# Patient Record
Sex: FEMALE | Race: BLACK OR AFRICAN AMERICAN | NOT HISPANIC OR LATINO | URBAN - METROPOLITAN AREA
[De-identification: names, ages, dates, MRNs, and addresses within clinical notes are randomized per-mention and may not be internally consistent; named-entity substitution may affect disease eponyms.]

---

## 2019-10-17 ENCOUNTER — EMERGENCY (EMERGENCY)
Facility: HOSPITAL | Age: 29
LOS: 1 days | Discharge: HOME | End: 2019-10-17
Attending: EMERGENCY MEDICINE | Admitting: EMERGENCY MEDICINE
Payer: MEDICAID

## 2019-10-17 VITALS
OXYGEN SATURATION: 100 % | HEART RATE: 89 BPM | TEMPERATURE: 99 F | DIASTOLIC BLOOD PRESSURE: 77 MMHG | SYSTOLIC BLOOD PRESSURE: 109 MMHG | RESPIRATION RATE: 16 BRPM

## 2019-10-17 PROCEDURE — 99283 EMERGENCY DEPT VISIT LOW MDM: CPT

## 2019-10-17 NOTE — ED ADULT TRIAGE NOTE - CHIEF COMPLAINT QUOTE
Pt arrives to ED c/o CP starting Sunday while walking. to center of chest that radiates up to throat and head.  Pt denies medical hx.  EKG completed in triage.  Pt states she had similar issue in the past with an URI but reports she does not have any of those symptoms at this time.

## 2019-10-18 PROCEDURE — 71046 X-RAY EXAM CHEST 2 VIEWS: CPT | Mod: 26

## 2019-10-18 RX ORDER — ACETAMINOPHEN 500 MG
650 TABLET ORAL ONCE
Refills: 0 | Status: COMPLETED | OUTPATIENT
Start: 2019-10-18 | End: 2019-10-18

## 2019-10-18 RX ORDER — IBUPROFEN 200 MG
600 TABLET ORAL ONCE
Refills: 0 | Status: COMPLETED | OUTPATIENT
Start: 2019-10-18 | End: 2019-10-18

## 2019-10-18 RX ADMIN — Medication 600 MILLIGRAM(S): at 01:55

## 2019-10-18 RX ADMIN — Medication 650 MILLIGRAM(S): at 01:55

## 2019-10-18 NOTE — ED PROVIDER NOTE - MUSCULOSKELETAL, MLM
+Mild tenderness to the 2nd intercostal space midclavicular line on the left.  Spine appears normal, range of motion is not limited, no muscle or joint tenderness

## 2019-10-18 NOTE — ED PROVIDER NOTE - CLINICAL SUMMARY MEDICAL DECISION MAKING FREE TEXT BOX
28 y/o female here with chest tightness. Will give Motrin and Tylenol most likely MSK in origin, and perform CXR. Will d/c home with PMD f/u as an outpatient.

## 2019-10-18 NOTE — ED PROVIDER NOTE - OBJECTIVE STATEMENT
30 y/o male with no pertinent PMHx presents to the ED with c/o chest tightness. Pt states has experience this pain intermittently for the last x 5 days. Pt notes has had similar chest tightness x 1 month ago and was seen by PMD and was given Abx for URI sx. Pt denies any fever, chills, cough, nasal congestion, SOB, papulations, N/V, parasthenia, weakness, FHx of cardiac issue, Hx of blood clots, OSP, recent travel, or leg swelling. Of note pain is not pleuritic.

## 2019-10-18 NOTE — ED PROVIDER NOTE - PATIENT PORTAL LINK FT
You can access the FollowMyHealth Patient Portal offered by [facility] by registering at the following website: [email or web address]. By joining [facility]’s FollowMyHealth portal, you will also be able to view your health information using other applications (apps) compatible with our system.

## 2020-09-15 ENCOUNTER — OUTPATIENT (OUTPATIENT)
Dept: INPATIENT UNIT | Facility: HOSPITAL | Age: 30
LOS: 1 days | Discharge: HOME | End: 2020-09-15
Payer: MEDICAID

## 2020-09-15 VITALS
SYSTOLIC BLOOD PRESSURE: 114 MMHG | TEMPERATURE: 98 F | RESPIRATION RATE: 16 BRPM | HEART RATE: 10 BPM | DIASTOLIC BLOOD PRESSURE: 66 MMHG

## 2020-09-15 VITALS — SYSTOLIC BLOOD PRESSURE: 119 MMHG | HEART RATE: 94 BPM | DIASTOLIC BLOOD PRESSURE: 72 MMHG

## 2020-09-15 DIAGNOSIS — Z33.2 ENCOUNTER FOR ELECTIVE TERMINATION OF PREGNANCY: Chronic | ICD-10-CM

## 2020-09-15 DIAGNOSIS — Z3A.00 WEEKS OF GESTATION OF PREGNANCY NOT SPECIFIED: ICD-10-CM

## 2020-09-15 DIAGNOSIS — O26.899 OTHER SPECIFIED PREGNANCY RELATED CONDITIONS, UNSPECIFIED TRIMESTER: ICD-10-CM

## 2020-09-15 LAB
APPEARANCE UR: CLEAR — SIGNIFICANT CHANGE UP
BILIRUB UR-MCNC: NEGATIVE — SIGNIFICANT CHANGE UP
BLOOD UR QL VISUAL: NEGATIVE — SIGNIFICANT CHANGE UP
COLOR SPEC: SIGNIFICANT CHANGE UP
GLUCOSE UR-MCNC: NEGATIVE — SIGNIFICANT CHANGE UP
KETONES UR-MCNC: NEGATIVE — SIGNIFICANT CHANGE UP
LEUKOCYTE ESTERASE UR-ACNC: NEGATIVE — SIGNIFICANT CHANGE UP
NITRITE UR-MCNC: NEGATIVE — SIGNIFICANT CHANGE UP
PH UR: 7 — SIGNIFICANT CHANGE UP (ref 5–8)
PROT UR-MCNC: NEGATIVE — SIGNIFICANT CHANGE UP
SP GR SPEC: 1.02 — SIGNIFICANT CHANGE UP (ref 1–1.04)
UROBILINOGEN FLD QL: NORMAL — SIGNIFICANT CHANGE UP

## 2020-09-15 PROCEDURE — 99212 OFFICE O/P EST SF 10 MIN: CPT | Mod: 25

## 2020-09-15 PROCEDURE — 59025 FETAL NON-STRESS TEST: CPT | Mod: 26

## 2020-09-15 PROCEDURE — 76816 OB US FOLLOW-UP PER FETUS: CPT | Mod: 26

## 2020-09-15 PROCEDURE — 76817 TRANSVAGINAL US OBSTETRIC: CPT | Mod: 26

## 2020-09-15 RX ORDER — FAMOTIDINE 10 MG/ML
20 INJECTION INTRAVENOUS DAILY
Refills: 0 | Status: DISCONTINUED | OUTPATIENT
Start: 2020-09-15 | End: 2020-09-30

## 2020-09-15 RX ORDER — INDOMETHACIN 50 MG
50 CAPSULE ORAL ONCE
Refills: 0 | Status: COMPLETED | OUTPATIENT
Start: 2020-09-15 | End: 2020-09-15

## 2020-09-15 RX ADMIN — FAMOTIDINE 20 MILLIGRAM(S): 10 INJECTION INTRAVENOUS at 17:43

## 2020-09-15 RX ADMIN — Medication 50 MILLIGRAM(S): at 17:43

## 2020-09-15 NOTE — OB PROVIDER TRIAGE NOTE - NSOBPROVIDERNOTE_OBGYN_ALL_OB_FT
1750  plan discussed with dr rogers, dr haile  Indocin 50 mg PO + Pepcid 20 mg PO for myoma pain  PO fluids   continue EFM 1750  plan discussed with dr rogers, dr haile  Indocin 50 mg PO + Pepcid 20 mg PO for myoma pain  PO fluids   continue EFM      1900  patient  feels minimal relief  PO fluid, gingerale and snack given  will continue to monitor and reevaluate paein  report given to night shift NP 175  plan discussed with dr rogers, dr haile  Indocin 50 mg PO + Pepcid 20 mg PO for myoma pain  PO fluids   continue EFM      1900  patient  feels minimal relief  PO fluid, gingerale and snack given  will continue to monitor and reevaluate paein  report given to night shift NP    @1930  NST reactive with moderate variability, cat 1, 10x10 accels  toco no contractions noted  Patient's history and physical exam d/w with Dr Wheeler and Dr Haile   Fetal non stress reviewed with Dr Wheeler   Patient reports relief, pt reports pain 5/10   Patient cleared for discharge  Patient to follow up with scheduled appointment  Patient to return to hospital if pain worsens  Fetal kick counts reviewed   labor precautions reviewed

## 2020-09-15 NOTE — OB PROVIDER TRIAGE NOTE - ADDITIONAL INSTRUCTIONS
Patient to follow up with scheduled appointment  Patient to return to hospital if pain worsens  Tylenol for pain relief   Fetal kick counts reviewed   labor precautions reviewed
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
## 2020-09-15 NOTE — OB PROVIDER TRIAGE NOTE - NSHPPHYSICALEXAM_GEN_ALL_CORE
Vital Signs Last 24 Hrs  T(C): 36.9 (15 Sep 2020 16:07), Max: 36.9 (15 Sep 2020 16:07)  T(F): 98.4 (15 Sep 2020 16:07), Max: 98.4 (15 Sep 2020 16:07)  HR: 105 (15 Sep 2020 16:16) (10 - 105)  BP: 114/66 (15 Sep 2020 16:16) (114/66 - 114/66)  BP(mean): --  RR: 16 (15 Sep 2020 16:07) (16 - 16)  SpO2: --    A&O x3  CTAB  abdomen: gravid, soft, pinpoint tenderness over suprapubic area, no diffuse tenderness  sse: no lof, no VB, cervix appears closed  TVS CL 2.8-3.1 no funneling no dynamic changes  TAS: vtx posterior placenta, bpp 8/8, mvp 5.73

## 2020-09-15 NOTE — OB PROVIDER TRIAGE NOTE - NSHPLABSRESULTS_GEN_ALL_CORE
Urinalysis Basic - ( 15 Sep 2020 16:19 )    Color: LIGHT YELLOW / Appearance: CLEAR / S.017 / pH: 7.0  Gluc: NEGATIVE / Ketone: NEGATIVE  / Bili: NEGATIVE / Urobili: NORMAL   Blood: NEGATIVE / Protein: NEGATIVE / Nitrite: NEGATIVE   Leuk Esterase: NEGATIVE / RBC: x / WBC x   Sq Epi: x / Non Sq Epi: x / Bacteria: x

## 2020-11-15 ENCOUNTER — OUTPATIENT (OUTPATIENT)
Dept: INPATIENT UNIT | Facility: HOSPITAL | Age: 30
LOS: 1 days | Discharge: LEFT BEFORE DISCHARGE | End: 2020-11-15
Payer: COMMERCIAL

## 2020-11-15 ENCOUNTER — EMERGENCY (EMERGENCY)
Facility: HOSPITAL | Age: 30
LOS: 1 days | Discharge: STILL ADMITTED OR ADMINISTRATIVE | End: 2020-11-15
Attending: EMERGENCY MEDICINE | Admitting: EMERGENCY MEDICINE
Payer: COMMERCIAL

## 2020-11-15 VITALS
DIASTOLIC BLOOD PRESSURE: 73 MMHG | WEIGHT: 210.1 LBS | HEART RATE: 90 BPM | SYSTOLIC BLOOD PRESSURE: 125 MMHG | HEIGHT: 66 IN

## 2020-11-15 VITALS
TEMPERATURE: 98 F | HEART RATE: 90 BPM | DIASTOLIC BLOOD PRESSURE: 73 MMHG | RESPIRATION RATE: 18 BRPM | SYSTOLIC BLOOD PRESSURE: 125 MMHG

## 2020-11-15 VITALS
SYSTOLIC BLOOD PRESSURE: 110 MMHG | RESPIRATION RATE: 18 BRPM | OXYGEN SATURATION: 100 % | HEART RATE: 105 BPM | TEMPERATURE: 98 F | DIASTOLIC BLOOD PRESSURE: 88 MMHG

## 2020-11-15 DIAGNOSIS — Z33.2 ENCOUNTER FOR ELECTIVE TERMINATION OF PREGNANCY: Chronic | ICD-10-CM

## 2020-11-15 DIAGNOSIS — O26.899 OTHER SPECIFIED PREGNANCY RELATED CONDITIONS, UNSPECIFIED TRIMESTER: ICD-10-CM

## 2020-11-15 DIAGNOSIS — Z3A.00 WEEKS OF GESTATION OF PREGNANCY NOT SPECIFIED: ICD-10-CM

## 2020-11-15 PROBLEM — D25.9 LEIOMYOMA OF UTERUS, UNSPECIFIED: Chronic | Status: ACTIVE | Noted: 2020-09-15

## 2020-11-15 PROBLEM — K11.7 DISTURBANCES OF SALIVARY SECRETION: Chronic | Status: ACTIVE | Noted: 2020-09-15

## 2020-11-15 LAB
APPEARANCE UR: CLEAR — SIGNIFICANT CHANGE UP
APTT BLD: 27.2 SEC — SIGNIFICANT CHANGE UP (ref 27–36.3)
BASOPHILS # BLD AUTO: 0.03 K/UL — SIGNIFICANT CHANGE UP (ref 0–0.2)
BASOPHILS NFR BLD AUTO: 0.5 % — SIGNIFICANT CHANGE UP (ref 0–2)
BILIRUB UR-MCNC: NEGATIVE — SIGNIFICANT CHANGE UP
BLD GP AB SCN SERPL QL: NEGATIVE — SIGNIFICANT CHANGE UP
BLOOD UR QL VISUAL: NEGATIVE — SIGNIFICANT CHANGE UP
COLOR SPEC: SIGNIFICANT CHANGE UP
EOSINOPHIL # BLD AUTO: 0.04 K/UL — SIGNIFICANT CHANGE UP (ref 0–0.5)
EOSINOPHIL NFR BLD AUTO: 0.6 % — SIGNIFICANT CHANGE UP (ref 0–6)
FIBRINOGEN PPP-MCNC: 566 MG/DL — HIGH (ref 290–520)
GLUCOSE UR-MCNC: NEGATIVE — SIGNIFICANT CHANGE UP
HCT VFR BLD CALC: 33.5 % — LOW (ref 34.5–45)
HGB BLD-MCNC: 10.7 G/DL — LOW (ref 11.5–15.5)
IMM GRANULOCYTES NFR BLD AUTO: 0.3 % — SIGNIFICANT CHANGE UP (ref 0–1.5)
INR BLD: 1 — SIGNIFICANT CHANGE UP (ref 0.88–1.16)
KETONES UR-MCNC: NEGATIVE — SIGNIFICANT CHANGE UP
LEUKOCYTE ESTERASE UR-ACNC: NEGATIVE — SIGNIFICANT CHANGE UP
LYMPHOCYTES # BLD AUTO: 1.58 K/UL — SIGNIFICANT CHANGE UP (ref 1–3.3)
LYMPHOCYTES # BLD AUTO: 24.7 % — SIGNIFICANT CHANGE UP (ref 13–44)
MCHC RBC-ENTMCNC: 29.2 PG — SIGNIFICANT CHANGE UP (ref 27–34)
MCHC RBC-ENTMCNC: 31.9 % — LOW (ref 32–36)
MCV RBC AUTO: 91.3 FL — SIGNIFICANT CHANGE UP (ref 80–100)
MONOCYTES # BLD AUTO: 0.39 K/UL — SIGNIFICANT CHANGE UP (ref 0–0.9)
MONOCYTES NFR BLD AUTO: 6.1 % — SIGNIFICANT CHANGE UP (ref 2–14)
NEUTROPHILS # BLD AUTO: 4.34 K/UL — SIGNIFICANT CHANGE UP (ref 1.8–7.4)
NEUTROPHILS NFR BLD AUTO: 67.8 % — SIGNIFICANT CHANGE UP (ref 43–77)
NITRITE UR-MCNC: NEGATIVE — SIGNIFICANT CHANGE UP
NRBC # FLD: 0 K/UL — SIGNIFICANT CHANGE UP (ref 0–0)
PH UR: 7 — SIGNIFICANT CHANGE UP (ref 5–8)
PLATELET # BLD AUTO: 216 K/UL — SIGNIFICANT CHANGE UP (ref 150–400)
PMV BLD: 10.6 FL — SIGNIFICANT CHANGE UP (ref 7–13)
PROT UR-MCNC: NEGATIVE — SIGNIFICANT CHANGE UP
PROTHROM AB SERPL-ACNC: 11.4 SEC — SIGNIFICANT CHANGE UP (ref 10.6–13.6)
RBC # BLD: 3.67 M/UL — LOW (ref 3.8–5.2)
RBC # FLD: 12.6 % — SIGNIFICANT CHANGE UP (ref 10.3–14.5)
RH IG SCN BLD-IMP: POSITIVE — SIGNIFICANT CHANGE UP
SP GR SPEC: 1.01 — SIGNIFICANT CHANGE UP (ref 1–1.04)
UROBILINOGEN FLD QL: NORMAL — SIGNIFICANT CHANGE UP
WBC # BLD: 6.4 K/UL — SIGNIFICANT CHANGE UP (ref 3.8–10.5)
WBC # FLD AUTO: 6.4 K/UL — SIGNIFICANT CHANGE UP (ref 3.8–10.5)

## 2020-11-15 PROCEDURE — 96360 HYDRATION IV INFUSION INIT: CPT

## 2020-11-15 PROCEDURE — 99282 EMERGENCY DEPT VISIT SF MDM: CPT

## 2020-11-15 PROCEDURE — 76818 FETAL BIOPHYS PROFILE W/NST: CPT | Mod: 26

## 2020-11-15 PROCEDURE — 99214 OFFICE O/P EST MOD 30 MIN: CPT | Mod: 25

## 2020-11-15 RX ORDER — SODIUM CHLORIDE 9 MG/ML
1000 INJECTION, SOLUTION INTRAVENOUS ONCE
Refills: 0 | Status: COMPLETED | OUTPATIENT
Start: 2020-11-15 | End: 2020-11-15

## 2020-11-15 RX ADMIN — SODIUM CHLORIDE 1000 MILLILITER(S): 9 INJECTION, SOLUTION INTRAVENOUS at 17:09

## 2020-11-15 NOTE — OB PROVIDER TRIAGE NOTE - ADDITIONAL INSTRUCTIONS
Follow up with Dr for routine OB care  Continue fetal kick counts/fetal awareness  Return to triage for: decreased fetal movement, leakage of fluid, vaginal bleeding, increase in contraction frequency/intensity, or for any other concerns.
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
## 2020-11-15 NOTE — ED PROVIDER NOTE - PHYSICAL EXAMINATION
Physical Exam:  General: NAD, Conversive  Eyes: EOMI, Conjunctiva and sclera clear  Neck: No JVD  Lungs: Clear to auscultation bilaterally, no wheeze, no rhonchi  Heart: Normal S1, S2, no murmurs  Abdomen: Soft, nontender, nondistended, no CVA tenderness  Extremities: 2+ peripheral pulses, no edema  Psych: AAO X3  Neck: No pain with lateral motion, no midline tenderness  Neurologic: Non-focal,
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
## 2020-11-15 NOTE — OB PROVIDER TRIAGE NOTE - NS_OBGYNHISTORY_OBGYN_ALL_OB_FT
2008-Surgical ETOP  Uterine fibroid (unknown size/location)  Pt denies any h/o: Abn. PAP, ovarian cysts, breast problems, STDs/STIs

## 2020-11-15 NOTE — OB PROVIDER TRIAGE NOTE - NSHPLABSRESULTS_GEN_ALL_CORE
Vital Signs Last 24 Hrs  T(C): 36.9 (15 Nov 2020 15:48), Max: 36.9 (15 Nov 2020 15:46)  T(F): 98.42 (15 Nov 2020 15:48), Max: 98.42 (15 Nov 2020 15:48)  HR: 90 (15 Nov 2020 15:51) (90 - 105)  BP: 125/73 (15 Nov 2020 15:51) (110/88 - 125/73)  BP(mean): --  RR: 18 (15 Nov 2020 15:46) (18 - 18)  SpO2: 100% (15 Nov 2020 14:17) (100% - 100%) Type + Screen (11.15.20 @ 16:08)   ABO Interpretation: A   Rh Interpretation: Positive   Antibody Screen: Negative                           10.7   6.40  )-----------( 216      ( 15 Nov 2020 16:16 )             33.5     PT/INR - ( 15 Nov 2020 16:16 )   PT: 11.4 SEC;   INR: 1.00          PTT - ( 15 Nov 2020 16:16 )  PTT:27.2 SEC    Fibrinogen Assay (11.15.20 @ 16:16)   Fibrinogen Assay: 566.0 mg/dL     Urinalysis Basic - ( 15 Nov 2020 16:16 )    Color: LIGHT YELLOW / Appearance: CLEAR / S.015 / pH: 7.0  Gluc: NEGATIVE / Ketone: NEGATIVE  / Bili: NEGATIVE / Urobili: NORMAL   Blood: NEGATIVE / Protein: NEGATIVE / Nitrite: NEGATIVE   Leuk Esterase: NEGATIVE / RBC: x / WBC x   Sq Epi: x / Non Sq Epi: x / Bacteria: x

## 2020-11-15 NOTE — ED PROVIDER NOTE - NS ED ROS FT
GENERAL: No fever or chills  EYES: No change in vision  HEENT: No trouble swallowing or speaking  CARDIAC: No chest pain  PULMONARY: No cough or SOB  GI: No abdominal pain, no nausea or no vomiting, no diarrhea or constipation  : No changes in urination  SKIN: No rashes  NEURO: + mild headache, no numbness  MSK: No joint pain  Otherwise as HPI or negative.
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
## 2020-11-15 NOTE — OB PROVIDER TRIAGE NOTE - NSOBPROVIDERNOTE_OBGYN_ALL_OB_FT
Patient made aware of regular asymptomatic contractions and need to stay overnight for observation. Patient declines admission at this time stating baby shower is going on right now, agreeable to a maximum of one hour of prolonged monitoring. Pt counseled on likelihood of placental abruption after abdominal trauma, which could lead to complications, including fetal demise. Pt verbalized understanding and states she will come back tomorrow for prolonged monitoring if anything is concerning.  1705-Dr Mcneal, PGY-3 notified of above findings. Will be in for bedside consult. Patient made aware of regular asymptomatic contractions and need to stay overnight for observation. Patient declines admission at this time stating baby shower is going on right now, agreeable to a maximum of one hour of prolonged monitoring. Pt counseled on likelihood of placental abruption after abdominal trauma, which could lead to complications, including fetal demise. Pt verbalized understanding and states she will come back tomorrow for prolonged monitoring if anything is concerning.  1705-Dr Mcneal, PGY-3 notified of above findings. Will be in for bedside consult.  1720-Patient counseled by Dr Mcneal. Pt still wishes to sign out AMA after 1 hour of monitoring. Patient made aware of regular asymptomatic contractions and need to stay overnight for observation. Patient declines admission at this time stating baby shower is going on right now, agreeable to a maximum of one hour of prolonged monitoring. Pt counseled on likelihood of placental abruption after abdominal trauma, which could lead to complications, including fetal demise. Pt verbalized understanding and states she will come back tomorrow for prolonged monitoring if anything is concerning.  1705-Dr Mcneal, PGY-3 notified of above findings. Will be in for bedside consult.  1720-Patient counseled by Dr Mcneal. Pt still wishes to sign out AMA @ 6pm. Patient made aware of regular asymptomatic contractions and need to stay overnight for observation. Patient declines admission at this time stating baby shower is going on right now. Pt amenable to a maximum of one hour of prolonged monitoring. Pt counseled on likelihood of placental abruption after abdominal trauma, which could lead to complications, including fetal demise. Pt verbalized understanding and states she will come back tomorrow for prolonged monitoring if anything is concerning.  1705-Dr Mcneal, PGY-3 notified of above findings. Will be in for bedside consult.  1720-Patient counseled by Dr Mcneal. Pt still wishes to sign out AMA @ 6pm. Patient made aware of regular asymptomatic contractions and need to stay overnight for observation. Patient declines admission at this time stating baby shower is going on right now. Pt amenable to a maximum of one hour of prolonged monitoring. Pt counseled on likelihood of placental abruption after abdominal trauma, which could lead to complications, including fetal demise. Pt verbalized understanding and states she will come back tomorrow for prolonged monitoring if anything is concerning.  1705-Dr Mcneal, PGY-3 notified of above findings. Will be in for bedside consult.  1720-Patient counseled by Dr Mcneal. Pt still wishes to sign out AMA @ 6pm.  Patient s/p 1:15 hr of monitoring: Cat 1 tracing. [place] q 3-4 min, pt denies pain.   Patient signed out AMA as mentioned above. Pt agrees to return to triage for: Decreased fetal movement, vaginal bleeding, leakage of fluid, painful contractions.
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
## 2020-11-15 NOTE — OB RN TRIAGE NOTE - PRO MENTAL HEALTH SX RECENT
none

## 2020-11-16 ENCOUNTER — OUTPATIENT (OUTPATIENT)
Dept: INPATIENT UNIT | Facility: HOSPITAL | Age: 30
LOS: 1 days | Discharge: HOME | End: 2020-11-16
Payer: MEDICAID

## 2020-11-16 VITALS
TEMPERATURE: 99 F | RESPIRATION RATE: 16 BRPM | HEART RATE: 100 BPM | DIASTOLIC BLOOD PRESSURE: 74 MMHG | SYSTOLIC BLOOD PRESSURE: 126 MMHG

## 2020-11-16 DIAGNOSIS — Z3A.00 WEEKS OF GESTATION OF PREGNANCY NOT SPECIFIED: ICD-10-CM

## 2020-11-16 DIAGNOSIS — O26.899 OTHER SPECIFIED PREGNANCY RELATED CONDITIONS, UNSPECIFIED TRIMESTER: ICD-10-CM

## 2020-11-16 DIAGNOSIS — Z33.2 ENCOUNTER FOR ELECTIVE TERMINATION OF PREGNANCY: Chronic | ICD-10-CM

## 2020-11-16 LAB
APPEARANCE UR: CLEAR — SIGNIFICANT CHANGE UP
APTT BLD: 27.5 SEC — SIGNIFICANT CHANGE UP (ref 27–36.3)
BILIRUB UR-MCNC: NEGATIVE — SIGNIFICANT CHANGE UP
BLOOD UR QL VISUAL: NEGATIVE — SIGNIFICANT CHANGE UP
COLOR SPEC: YELLOW — SIGNIFICANT CHANGE UP
FIBRINOGEN PPP-MCNC: 557 MG/DL — HIGH (ref 290–520)
GLUCOSE UR-MCNC: NEGATIVE — SIGNIFICANT CHANGE UP
HCT VFR BLD CALC: 31.8 % — LOW (ref 34.5–45)
HGB BLD-MCNC: 10.1 G/DL — LOW (ref 11.5–15.5)
INR BLD: 0.96 — SIGNIFICANT CHANGE UP (ref 0.88–1.16)
KETONES UR-MCNC: NEGATIVE — SIGNIFICANT CHANGE UP
LEUKOCYTE ESTERASE UR-ACNC: NEGATIVE — SIGNIFICANT CHANGE UP
MCHC RBC-ENTMCNC: 28.9 PG — SIGNIFICANT CHANGE UP (ref 27–34)
MCHC RBC-ENTMCNC: 31.8 % — LOW (ref 32–36)
MCV RBC AUTO: 91.1 FL — SIGNIFICANT CHANGE UP (ref 80–100)
NITRITE UR-MCNC: NEGATIVE — SIGNIFICANT CHANGE UP
NRBC # FLD: 0 K/UL — SIGNIFICANT CHANGE UP (ref 0–0)
PH UR: 6.5 — SIGNIFICANT CHANGE UP (ref 5–8)
PLATELET # BLD AUTO: 210 K/UL — SIGNIFICANT CHANGE UP (ref 150–400)
PMV BLD: 10.6 FL — SIGNIFICANT CHANGE UP (ref 7–13)
PROT UR-MCNC: 10 — SIGNIFICANT CHANGE UP
PROTHROM AB SERPL-ACNC: 11.1 SEC — SIGNIFICANT CHANGE UP (ref 10.6–13.6)
RBC # BLD: 3.49 M/UL — LOW (ref 3.8–5.2)
RBC # FLD: 12.7 % — SIGNIFICANT CHANGE UP (ref 10.3–14.5)
SP GR SPEC: 1.02 — SIGNIFICANT CHANGE UP (ref 1–1.04)
UROBILINOGEN FLD QL: NORMAL — SIGNIFICANT CHANGE UP
WBC # BLD: 6.16 K/UL — SIGNIFICANT CHANGE UP (ref 3.8–10.5)
WBC # FLD AUTO: 6.16 K/UL — SIGNIFICANT CHANGE UP (ref 3.8–10.5)

## 2020-11-16 PROCEDURE — 99214 OFFICE O/P EST MOD 30 MIN: CPT | Mod: 25

## 2020-11-16 PROCEDURE — 76816 OB US FOLLOW-UP PER FETUS: CPT | Mod: 26

## 2020-11-16 PROCEDURE — 76818 FETAL BIOPHYS PROFILE W/NST: CPT | Mod: 26

## 2020-11-16 RX ORDER — SODIUM CHLORIDE 9 MG/ML
1000 INJECTION, SOLUTION INTRAVENOUS ONCE
Refills: 0 | Status: COMPLETED | OUTPATIENT
Start: 2020-11-16 | End: 2020-11-16

## 2020-11-16 RX ADMIN — SODIUM CHLORIDE 1000 MILLILITER(S): 9 INJECTION, SOLUTION INTRAVENOUS at 22:05

## 2020-11-16 RX ADMIN — SODIUM CHLORIDE 1000 MILLILITER(S): 9 INJECTION, SOLUTION INTRAVENOUS at 20:40

## 2020-11-16 NOTE — OB PROVIDER TRIAGE NOTE - NSHPLABSRESULTS_GEN_ALL_CORE
Urinalysis Basic - ( 2020 19:55 )    Color: YELLOW / Appearance: CLEAR / S.018 / pH: 6.5  Gluc: NEGATIVE / Ketone: NEGATIVE  / Bili: NEGATIVE / Urobili: NORMAL   Blood: NEGATIVE / Protein: 10 / Nitrite: NEGATIVE   Leuk Esterase: NEGATIVE / RBC: x / WBC x   Sq Epi: x / Non Sq Epi: x / Bacteria: x Urinalysis Basic - ( 2020 19:55 )    Color: YELLOW / Appearance: CLEAR / S.018 / pH: 6.5  Gluc: NEGATIVE / Ketone: NEGATIVE  / Bili: NEGATIVE / Urobili: NORMAL   Blood: NEGATIVE / Protein: 10 / Nitrite: NEGATIVE   Leuk Esterase: NEGATIVE / RBC: x / WBC x   Sq Epi: x / Non Sq Epi: x / Bacteria: x    CBC Full  -  ( 2020 22:00 )  WBC Count : 6.16 K/uL  RBC Count : 3.49 M/uL  Hemoglobin : 10.1 g/dL  Hematocrit : 31.8 %  Platelet Count - Automated : 210 K/uL  Mean Cell Volume : 91.1 fL  Mean Cell Hemoglobin : 28.9 pg  Mean Cell Hemoglobin Concentration : 31.8 %  Auto Neutrophil # : x  Auto Lymphocyte # : x  Auto Monocyte # : x  Auto Eosinophil # : x  Auto Basophil # : x  Auto Neutrophil % : x  Auto Lymphocyte % : x  Auto Monocyte % : x  Auto Eosinophil % : x  Auto Basophil % : x    PT/INR - ( 2020 22:00 )   PT: 11.1 SEC;   INR: 0.96          PTT - ( 2020 22:00 )  PTT:27.5 SEC    Fibrinogen: 557    Reviewed with Dr Coleman
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
## 2020-11-16 NOTE — OB PROVIDER TRIAGE NOTE - NSHPPHYSICALEXAM_GEN_ALL_CORE
Vital Signs Last 24 Hrs  T(C): 37.2 (16 Nov 2020 19:39), Max: 37.2 (16 Nov 2020 19:39)  T(F): 99 (16 Nov 2020 19:39), Max: 99 (16 Nov 2020 19:39)  HR: 100 (16 Nov 2020 19:54) (100 - 100)  BP: 126/74 (16 Nov 2020 19:54) (126/74 - 126/74)  BP(mean): --  RR: 16 (16 Nov 2020 19:39) (16 - 16)  SpO2: --    General: A&O x3   Abdomen: soft, non tender. no guarding or rebound tenderness  SSE:  no bleeding noted in vault  scant leukorrhea noted  negative pooling  negative nitrazine   negative ferning   SVE:   0.5/50/-3   TAS:     NST in progress Vital Signs Last 24 Hrs  T(C): 37.2 (16 Nov 2020 19:39), Max: 37.2 (16 Nov 2020 19:39)  T(F): 99 (16 Nov 2020 19:39), Max: 99 (16 Nov 2020 19:39)  HR: 100 (16 Nov 2020 19:54) (100 - 100)  BP: 126/74 (16 Nov 2020 19:54) (126/74 - 126/74)  BP(mean): --  RR: 16 (16 Nov 2020 19:39) (16 - 16)  SpO2: --    General: A&O x3   Abdomen: soft, non tender. no guarding or rebound tenderness  SSE:  no bleeding noted in vault  scant leukorrhea noted  negative pooling  negative nitrazine   negative ferning   SVE:   0.5/50/-3   TAS: BPP 8/8, SUBHA 11.03, vertex presentation, posterior placenta     NST in progress

## 2020-11-16 NOTE — OB PROVIDER TRIAGE NOTE - NSOBPROVIDERNOTE_OBGYN_ALL_OB_FT
Vital Signs Last 24 Hrs  T(C): 37.2 (16 Nov 2020 19:39), Max: 37.2 (16 Nov 2020 19:39)  T(F): 99 (16 Nov 2020 19:39), Max: 99 (16 Nov 2020 19:39)  HR: 100 (16 Nov 2020 19:54) (100 - 100)  BP: 126/74 (16 Nov 2020 19:54) (126/74 - 126/74)  BP(mean): --  RR: 16 (16 Nov 2020 19:39) (16 - 16)  SpO2: --    General: A&O x3   Abdomen: soft, non tender. no guarding or rebound tenderness  SSE:  no bleeding noted in vault  scant leukorrhea noted  negative pooling  negative nitrazine   negative ferning   SVE:   0.5/50/-3   TAS:     NST in progress    Plan:   -Will continue to monitor patient   -1L bolus Vital Signs Last 24 Hrs  T(C): 37.2 (16 Nov 2020 19:39), Max: 37.2 (16 Nov 2020 19:39)  T(F): 99 (16 Nov 2020 19:39), Max: 99 (16 Nov 2020 19:39)  HR: 100 (16 Nov 2020 19:54) (100 - 100)  BP: 126/74 (16 Nov 2020 19:54) (126/74 - 126/74)  BP(mean): --  RR: 16 (16 Nov 2020 19:39) (16 - 16)  SpO2: --    General: A&O x3   Abdomen: soft, non tender. no guarding or rebound tenderness  SSE:  no bleeding noted in vault  scant leukorrhea noted  negative pooling  negative nitrazine   negative ferning   SVE:   0.5/50/-3   TAS: BPP 8/8, SUBHA 11.03cm, posterior placenta, vertex presentation     NST in progress    Plan:   -Will continue to monitor patient   -1L bolus Vital Signs Last 24 Hrs  T(C): 37.2 (16 Nov 2020 19:39), Max: 37.2 (16 Nov 2020 19:39)  T(F): 99 (16 Nov 2020 19:39), Max: 99 (16 Nov 2020 19:39)  HR: 100 (16 Nov 2020 19:54) (100 - 100)  BP: 126/74 (16 Nov 2020 19:54) (126/74 - 126/74)  BP(mean): --  RR: 16 (16 Nov 2020 19:39) (16 - 16)  SpO2: --    General: A&O x3   Abdomen: soft, non tender. no guarding or rebound tenderness  SSE:  no bleeding noted in vault  scant leukorrhea noted  negative pooling  negative nitrazine   negative ferning   SVE:   0.5/50/-3   TAS: BPP 8/8, SUBHA 11.03cm, posterior placenta, vertex presentation     NST in progress    Plan:   -Will continue to monitor patient   -1L bolus    @ Vital Signs Last 24 Hrs  T(C): 37.2 (16 Nov 2020 19:39), Max: 37.2 (16 Nov 2020 19:39)  T(F): 99 (16 Nov 2020 19:39), Max: 99 (16 Nov 2020 19:39)  HR: 100 (16 Nov 2020 19:54) (100 - 100)  BP: 126/74 (16 Nov 2020 19:54) (126/74 - 126/74)  BP(mean): --  RR: 16 (16 Nov 2020 19:39) (16 - 16)  SpO2: --    General: A&O x3   Abdomen: soft, non tender. no guarding or rebound tenderness  SSE:  no bleeding noted in vault  scant leukorrhea noted  negative pooling  negative nitrazine   negative ferning   SVE:   0.5/50/-3   TAS: BPP 8/8, SUBHA 11.03cm, posterior placenta, vertex presentation     NST in progress    Plan:   -Will continue to monitor patient   -1L bolus    @2040  D/w with Dr Zepeda   NST non reactive with moderate variability, cat2  toco irregular ctx 1-2 minutes     Plan:  -Pt resuscitated with position   -1L bolus   -STAT CBC, PT/PTT/Fibrinogen Vital Signs Last 24 Hrs  T(C): 37.2 (2020 19:39), Max: 37.2 (2020 19:39)  T(F): 99 (2020 19:39), Max: 99 (2020 19:39)  HR: 100 (2020 19:54) (100 - 100)  BP: 126/74 (2020 19:54) (126/74 - 126/74)  BP(mean): --  RR: 16 (2020 19:39) (16 - 16)  SpO2: --    General: A&O x3   Abdomen: soft, non tender. no guarding or rebound tenderness  SSE:  no bleeding noted in vault  scant leukorrhea noted  negative pooling  negative nitrazine   negative ferning   SVE:   0.5/50/-3   TAS: BPP 8/8, SUBHA 11.03cm, posterior placenta, vertex presentation     NST in progress    Plan:   -Will continue to monitor patient   -1L bolus    @0  D/w with Dr Zepeda   NST non reactive with moderate variability, cat1  toco irregular ctx 1-2 minutes   pt denies feeling any contractions     Plan:  -Pt resuscitated with position   -1L bolus   -STAT CBC, PT/PTT/Fibrinogen    @2347  Fetal non-stress test reviewed and cleared by Dr Coleman, Dr Barnett and Niurka  NP expressed concerns regarding Non Reactive NST in four hours with ctx 1-2 minutes   Patient denies any pain at this time   Vital signs stable   Plan:  -Will repeat SVE   -As per MD, if unchanged, patient will be cleared for discharge    @0030  Repeat SVE: 05./50/-3  d/w with Dr Coleman and Dr Barnett  Maternal and fetal status reassuring   Plan:  -Patient cleared for discharge   -Patient will follow up with scheduled appointment on 2020  -Fetal kick counts reviewed with patient  - labor precautions reviewed with patient   -Signs and symptoms of abruption reviewed with patient   -Patient to increase hydration
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
## 2020-11-16 NOTE — OB PROVIDER TRIAGE NOTE - ADDITIONAL INSTRUCTIONS
-Patient will follow up with scheduled appointment on 2020  -Fetal kick counts reviewed with patient  - labor precautions reviewed with patient   -Signs and symptoms of abruption reviewed with patient   -Patient to increase hydration

## 2020-11-17 VITALS — DIASTOLIC BLOOD PRESSURE: 74 MMHG | HEART RATE: 86 BPM | SYSTOLIC BLOOD PRESSURE: 123 MMHG

## 2020-11-18 LAB — RBC # BLD FETUS AUTO: 0.1 — SIGNIFICANT CHANGE UP

## 2020-12-09 ENCOUNTER — INPATIENT (INPATIENT)
Facility: HOSPITAL | Age: 30
LOS: 1 days | Discharge: HOME | End: 2020-12-11
Attending: SPECIALIST | Admitting: SPECIALIST
Payer: MEDICAID

## 2020-12-09 VITALS
DIASTOLIC BLOOD PRESSURE: 72 MMHG | SYSTOLIC BLOOD PRESSURE: 130 MMHG | HEART RATE: 94 BPM | TEMPERATURE: 99 F | RESPIRATION RATE: 16 BRPM

## 2020-12-09 DIAGNOSIS — Z3A.00 WEEKS OF GESTATION OF PREGNANCY NOT SPECIFIED: ICD-10-CM

## 2020-12-09 DIAGNOSIS — O26.899 OTHER SPECIFIED PREGNANCY RELATED CONDITIONS, UNSPECIFIED TRIMESTER: ICD-10-CM

## 2020-12-09 DIAGNOSIS — Z33.2 ENCOUNTER FOR ELECTIVE TERMINATION OF PREGNANCY: Chronic | ICD-10-CM

## 2020-12-09 DIAGNOSIS — O42.90 PREMATURE RUPTURE OF MEMBRANES, UNSPECIFIED AS TO LENGTH OF TIME BETWEEN RUPTURE AND ONSET OF LABOR, UNSPECIFIED WEEKS OF GESTATION: ICD-10-CM

## 2020-12-09 LAB
ALBUMIN SERPL ELPH-MCNC: 3.8 G/DL — SIGNIFICANT CHANGE UP (ref 3.3–5)
ALP SERPL-CCNC: 101 U/L — SIGNIFICANT CHANGE UP (ref 40–120)
ALT FLD-CCNC: 9 U/L — SIGNIFICANT CHANGE UP (ref 4–33)
ANION GAP SERPL CALC-SCNC: 13 MMOL/L — SIGNIFICANT CHANGE UP (ref 7–14)
APPEARANCE UR: ABNORMAL
APTT BLD: 26.6 SEC — LOW (ref 27–36.3)
AST SERPL-CCNC: 17 U/L — SIGNIFICANT CHANGE UP (ref 4–32)
BACTERIA # UR AUTO: ABNORMAL
BASOPHILS # BLD AUTO: 0.02 K/UL — SIGNIFICANT CHANGE UP (ref 0–0.2)
BASOPHILS NFR BLD AUTO: 0.2 % — SIGNIFICANT CHANGE UP (ref 0–2)
BILIRUB SERPL-MCNC: 0.3 MG/DL — SIGNIFICANT CHANGE UP (ref 0.2–1.2)
BILIRUB UR-MCNC: NEGATIVE — SIGNIFICANT CHANGE UP
BLD GP AB SCN SERPL QL: NEGATIVE — SIGNIFICANT CHANGE UP
BUN SERPL-MCNC: 8 MG/DL — SIGNIFICANT CHANGE UP (ref 7–23)
CALCIUM SERPL-MCNC: 9.6 MG/DL — SIGNIFICANT CHANGE UP (ref 8.4–10.5)
CHLORIDE SERPL-SCNC: 104 MMOL/L — SIGNIFICANT CHANGE UP (ref 98–107)
CO2 SERPL-SCNC: 23 MMOL/L — SIGNIFICANT CHANGE UP (ref 22–31)
COLOR SPEC: ABNORMAL
CREAT ?TM UR-MCNC: <4 MG/DL — SIGNIFICANT CHANGE UP
CREAT SERPL-MCNC: 0.58 MG/DL — SIGNIFICANT CHANGE UP (ref 0.5–1.3)
DIFF PNL FLD: ABNORMAL
EOSINOPHIL # BLD AUTO: 0.04 K/UL — SIGNIFICANT CHANGE UP (ref 0–0.5)
EOSINOPHIL NFR BLD AUTO: 0.5 % — SIGNIFICANT CHANGE UP (ref 0–6)
EPI CELLS # UR: 10 /HPF — HIGH (ref 0–5)
FIBRINOGEN PPP-MCNC: 626 MG/DL — HIGH (ref 300–520)
GLUCOSE SERPL-MCNC: 87 MG/DL — SIGNIFICANT CHANGE UP (ref 70–99)
GLUCOSE UR QL: NEGATIVE — SIGNIFICANT CHANGE UP
HCT VFR BLD CALC: 36.4 % — SIGNIFICANT CHANGE UP (ref 34.5–45)
HGB BLD-MCNC: 11.5 G/DL — SIGNIFICANT CHANGE UP (ref 11.5–15.5)
IANC: 6.74 K/UL — SIGNIFICANT CHANGE UP (ref 1.5–8.5)
IMM GRANULOCYTES NFR BLD AUTO: 0.3 % — SIGNIFICANT CHANGE UP (ref 0–1.5)
INR BLD: 0.97 RATIO — SIGNIFICANT CHANGE UP (ref 0.88–1.17)
KETONES UR-MCNC: NEGATIVE — SIGNIFICANT CHANGE UP
LDH SERPL L TO P-CCNC: 191 U/L — SIGNIFICANT CHANGE UP (ref 135–225)
LEUKOCYTE ESTERASE UR-ACNC: NEGATIVE — SIGNIFICANT CHANGE UP
LYMPHOCYTES # BLD AUTO: 1.43 K/UL — SIGNIFICANT CHANGE UP (ref 1–3.3)
LYMPHOCYTES # BLD AUTO: 16.4 % — SIGNIFICANT CHANGE UP (ref 13–44)
MCHC RBC-ENTMCNC: 28.8 PG — SIGNIFICANT CHANGE UP (ref 27–34)
MCHC RBC-ENTMCNC: 31.6 GM/DL — LOW (ref 32–36)
MCV RBC AUTO: 91.2 FL — SIGNIFICANT CHANGE UP (ref 80–100)
MONOCYTES # BLD AUTO: 0.48 K/UL — SIGNIFICANT CHANGE UP (ref 0–0.9)
MONOCYTES NFR BLD AUTO: 5.5 % — SIGNIFICANT CHANGE UP (ref 2–14)
NEUTROPHILS # BLD AUTO: 6.74 K/UL — SIGNIFICANT CHANGE UP (ref 1.8–7.4)
NEUTROPHILS NFR BLD AUTO: 77.1 % — HIGH (ref 43–77)
NITRITE UR-MCNC: NEGATIVE — SIGNIFICANT CHANGE UP
NRBC # BLD: 0 /100 WBCS — SIGNIFICANT CHANGE UP
NRBC # FLD: 0.02 K/UL — HIGH
PH UR: 8 — SIGNIFICANT CHANGE UP (ref 5–8)
PLATELET # BLD AUTO: 196 K/UL — SIGNIFICANT CHANGE UP (ref 150–400)
POTASSIUM SERPL-MCNC: 4.2 MMOL/L — SIGNIFICANT CHANGE UP (ref 3.5–5.3)
POTASSIUM SERPL-SCNC: 4.2 MMOL/L — SIGNIFICANT CHANGE UP (ref 3.5–5.3)
PROT ?TM UR-MCNC: 146 MG/DL — SIGNIFICANT CHANGE UP
PROT SERPL-MCNC: 6.9 G/DL — SIGNIFICANT CHANGE UP (ref 6–8.3)
PROT UR-MCNC: ABNORMAL
PROTHROM AB SERPL-ACNC: 11.1 SEC — SIGNIFICANT CHANGE UP (ref 9.8–13.1)
RBC # BLD: 3.99 M/UL — SIGNIFICANT CHANGE UP (ref 3.8–5.2)
RBC # FLD: 12.4 % — SIGNIFICANT CHANGE UP (ref 10.3–14.5)
RBC CASTS # UR COMP ASSIST: >10 /HPF — HIGH (ref 0–4)
RH IG SCN BLD-IMP: POSITIVE — SIGNIFICANT CHANGE UP
SARS-COV-2 IGG SERPL QL IA: POSITIVE
SARS-COV-2 IGM SERPL IA-ACNC: 15.5 INDEX — HIGH
SARS-COV-2 RNA SPEC QL NAA+PROBE: SIGNIFICANT CHANGE UP
SODIUM SERPL-SCNC: 140 MMOL/L — SIGNIFICANT CHANGE UP (ref 135–145)
SP GR SPEC: 1.01 — LOW (ref 1.01–1.02)
URATE SERPL-MCNC: 6.4 MG/DL — SIGNIFICANT CHANGE UP (ref 2.5–7)
UROBILINOGEN FLD QL: SIGNIFICANT CHANGE UP
WBC # BLD: 8.74 K/UL — SIGNIFICANT CHANGE UP (ref 3.8–10.5)
WBC # FLD AUTO: 8.74 K/UL — SIGNIFICANT CHANGE UP (ref 3.8–10.5)
WBC UR QL: 5 /HPF — SIGNIFICANT CHANGE UP (ref 0–5)

## 2020-12-09 PROCEDURE — 59409 OBSTETRICAL CARE: CPT | Mod: U9,UB,GC

## 2020-12-09 RX ORDER — IBUPROFEN 200 MG
600 TABLET ORAL EVERY 6 HOURS
Refills: 0 | Status: COMPLETED | OUTPATIENT
Start: 2020-12-09 | End: 2021-11-07

## 2020-12-09 RX ORDER — DIPHENHYDRAMINE HCL 50 MG
25 CAPSULE ORAL EVERY 6 HOURS
Refills: 0 | Status: DISCONTINUED | OUTPATIENT
Start: 2020-12-09 | End: 2020-12-11

## 2020-12-09 RX ORDER — MAGNESIUM HYDROXIDE 400 MG/1
30 TABLET, CHEWABLE ORAL
Refills: 0 | Status: DISCONTINUED | OUTPATIENT
Start: 2020-12-09 | End: 2020-12-11

## 2020-12-09 RX ORDER — OXYCODONE HYDROCHLORIDE 5 MG/1
5 TABLET ORAL ONCE
Refills: 0 | Status: DISCONTINUED | OUTPATIENT
Start: 2020-12-09 | End: 2020-12-11

## 2020-12-09 RX ORDER — SIMETHICONE 80 MG/1
80 TABLET, CHEWABLE ORAL EVERY 4 HOURS
Refills: 0 | Status: DISCONTINUED | OUTPATIENT
Start: 2020-12-09 | End: 2020-12-11

## 2020-12-09 RX ORDER — TETANUS TOXOID, REDUCED DIPHTHERIA TOXOID AND ACELLULAR PERTUSSIS VACCINE, ADSORBED 5; 2.5; 8; 8; 2.5 [IU]/.5ML; [IU]/.5ML; UG/.5ML; UG/.5ML; UG/.5ML
0.5 SUSPENSION INTRAMUSCULAR ONCE
Refills: 0 | Status: DISCONTINUED | OUTPATIENT
Start: 2020-12-09 | End: 2020-12-11

## 2020-12-09 RX ORDER — OXYCODONE HYDROCHLORIDE 5 MG/1
5 TABLET ORAL
Refills: 0 | Status: DISCONTINUED | OUTPATIENT
Start: 2020-12-09 | End: 2020-12-11

## 2020-12-09 RX ORDER — CITRIC ACID/SODIUM CITRATE 300-500 MG
15 SOLUTION, ORAL ORAL EVERY 6 HOURS
Refills: 0 | Status: DISCONTINUED | OUTPATIENT
Start: 2020-12-09 | End: 2020-12-09

## 2020-12-09 RX ORDER — HYDROCORTISONE 1 %
1 OINTMENT (GRAM) TOPICAL EVERY 6 HOURS
Refills: 0 | Status: DISCONTINUED | OUTPATIENT
Start: 2020-12-09 | End: 2020-12-11

## 2020-12-09 RX ORDER — AER TRAVELER 0.5 G/1
1 SOLUTION RECTAL; TOPICAL EVERY 4 HOURS
Refills: 0 | Status: DISCONTINUED | OUTPATIENT
Start: 2020-12-09 | End: 2020-12-11

## 2020-12-09 RX ORDER — BUTORPHANOL TARTRATE 2 MG/ML
2 INJECTION, SOLUTION INTRAMUSCULAR; INTRAVENOUS ONCE
Refills: 0 | Status: DISCONTINUED | OUTPATIENT
Start: 2020-12-09 | End: 2020-12-09

## 2020-12-09 RX ORDER — LANOLIN
1 OINTMENT (GRAM) TOPICAL EVERY 6 HOURS
Refills: 0 | Status: DISCONTINUED | OUTPATIENT
Start: 2020-12-09 | End: 2020-12-11

## 2020-12-09 RX ORDER — PRAMOXINE HYDROCHLORIDE 150 MG/15G
1 AEROSOL, FOAM RECTAL EVERY 4 HOURS
Refills: 0 | Status: DISCONTINUED | OUTPATIENT
Start: 2020-12-09 | End: 2020-12-11

## 2020-12-09 RX ORDER — BENZOCAINE 10 %
1 GEL (GRAM) MUCOUS MEMBRANE EVERY 6 HOURS
Refills: 0 | Status: DISCONTINUED | OUTPATIENT
Start: 2020-12-09 | End: 2020-12-11

## 2020-12-09 RX ORDER — OXYTOCIN 10 UNIT/ML
333.33 VIAL (ML) INJECTION
Qty: 20 | Refills: 0 | Status: DISCONTINUED | OUTPATIENT
Start: 2020-12-09 | End: 2020-12-09

## 2020-12-09 RX ORDER — SODIUM CHLORIDE 9 MG/ML
1000 INJECTION, SOLUTION INTRAVENOUS ONCE
Refills: 0 | Status: COMPLETED | OUTPATIENT
Start: 2020-12-09 | End: 2020-12-09

## 2020-12-09 RX ORDER — ACETAMINOPHEN 500 MG
975 TABLET ORAL
Refills: 0 | Status: DISCONTINUED | OUTPATIENT
Start: 2020-12-09 | End: 2020-12-11

## 2020-12-09 RX ORDER — SODIUM CHLORIDE 9 MG/ML
3 INJECTION INTRAMUSCULAR; INTRAVENOUS; SUBCUTANEOUS EVERY 8 HOURS
Refills: 0 | Status: DISCONTINUED | OUTPATIENT
Start: 2020-12-09 | End: 2020-12-11

## 2020-12-09 RX ORDER — KETOROLAC TROMETHAMINE 30 MG/ML
30 SYRINGE (ML) INJECTION ONCE
Refills: 0 | Status: DISCONTINUED | OUTPATIENT
Start: 2020-12-09 | End: 2020-12-09

## 2020-12-09 RX ORDER — SODIUM CHLORIDE 9 MG/ML
1000 INJECTION, SOLUTION INTRAVENOUS
Refills: 0 | Status: DISCONTINUED | OUTPATIENT
Start: 2020-12-09 | End: 2020-12-09

## 2020-12-09 RX ORDER — DIBUCAINE 1 %
1 OINTMENT (GRAM) RECTAL EVERY 6 HOURS
Refills: 0 | Status: DISCONTINUED | OUTPATIENT
Start: 2020-12-09 | End: 2020-12-11

## 2020-12-09 RX ORDER — OXYTOCIN 10 UNIT/ML
333.33 VIAL (ML) INJECTION
Qty: 20 | Refills: 0 | Status: DISCONTINUED | OUTPATIENT
Start: 2020-12-09 | End: 2020-12-10

## 2020-12-09 RX ADMIN — Medication 30 MILLIGRAM(S): at 22:13

## 2020-12-09 RX ADMIN — BUTORPHANOL TARTRATE 2 MILLIGRAM(S): 2 INJECTION, SOLUTION INTRAMUSCULAR; INTRAVENOUS at 17:45

## 2020-12-09 RX ADMIN — Medication 30 MILLIGRAM(S): at 21:42

## 2020-12-09 RX ADMIN — Medication 1000 MILLIUNIT(S)/MIN: at 21:43

## 2020-12-09 RX ADMIN — SODIUM CHLORIDE 125 MILLILITER(S): 9 INJECTION, SOLUTION INTRAVENOUS at 17:24

## 2020-12-09 RX ADMIN — BUTORPHANOL TARTRATE 2 MILLIGRAM(S): 2 INJECTION, SOLUTION INTRAMUSCULAR; INTRAVENOUS at 17:24

## 2020-12-09 NOTE — OB PROVIDER TRIAGE NOTE - NSHPPHYSICALEXAM_GEN_ALL_CORE
31 y/o Black female, para 0010 @ 38+5 wks gestation, single IUP.  No evidence of labor at this time  Gen: NAD; A+O x 3  Cardiac: S1/S2, R/R/R  Pulm: CTAB, unlabored breathing  Abdomen: Gravid, soft, non-tender  VS-BP: 130/72 (rpt 119/71, 118/72), P 94, RR 18, T 37.0, Pain 9/10   Cat 1 tracin bpm, moderate variability, + accels, no decels  [place]: Irregular. q 5-10 min  SVE: 1.5/50/-3, Intact membranes  GBS Negative

## 2020-12-09 NOTE — OB PROVIDER TRIAGE NOTE - ADDITIONAL INSTRUCTIONS
Follow up with current provider for routine OB care  Continue fetal kick counts/fetal awareness  Return to triage for: decreased fetal movement, leakage of fluid, vaginal bleeding, increase in contraction frequency/intensity, or for any other concerns.

## 2020-12-09 NOTE — OB PROVIDER H&P - ASSESSMENT
29 y/o Black female, para 0010 @ 38+5 wks gestation, single IUP.  IOL for PROM @ 3:15 pm/ clear  GBS Negative  Cephalic via bedside sonogram  Plan of care d/w Dr Chamorro  Report off to Dr Gleason, PGY-3    Plan:  -Admit to L&D  -For PO Cytotec  -Continuous EFM  -Routine Labs/ Covid PCR/ IgG/PEC labs  -VS per routine  -IVF  -Clear fluid diet  -Approved for Stadol  -Anesthesia consult

## 2020-12-09 NOTE — OB PROVIDER TRIAGE NOTE - NSOBPROVIDERNOTE_OBGYN_ALL_OB_FT
Dr Fernandez, PGY-4 notified of above findings  Patient cleared to be discharged home by Dr Fernandez, d/w Dr Chamorro Dr Fernandez, PGY-4 notified of above findings  Patient cleared to be discharged home by Dr Fernandez, d/w Dr Chamorro  Addendum: Pt reports LOF @ 3:15 pm. SSE: +Pooling, +SROM/clear, Nitrazine Positive, Fern Positive. SVE: 2/50/-3  Dr Gleason, PGY-3 updated.  Admit to L&D  See H&P

## 2020-12-09 NOTE — OB PROVIDER DELIVERY SUMMARY - NSPROVIDERDELIVERYNOTE_OBGYN_ALL_OB_FT
Spontaneous vaginal delivery of liveborn infant from MELANIE position. Head, shoulders, and body delivered easily. Infant was suctioned. No mec. Cord was clamped and cut and infant was passed to mother. Placenta delivered intact with a 3 vessel cord. Fundal massage was given and uterine fundus was found to be firm. Vaginal exam revealed an intact cervix, vaginal walls and sulci. Patient had a 1st degree laceration in the perineum that was repaired with 2.0 chromic suture. Excellent hemostasis was noted. Patient was stable. Count was correct x 2.

## 2020-12-09 NOTE — OB RN DELIVERY SUMMARY - NS_SEPSISRSKCALC_OBGYN_ALL_OB_FT
EOS calculated successfully. EOS Risk Factor: 0.5/1000 live births ([facility] national incidence); GA=38w5d; Temp=99.14; ROM=5.017; GBS='Unknown'; Antibiotics='No antibiotics or any antibiotics < 2 hrs prior to birth'

## 2020-12-09 NOTE — OB PROVIDER TRIAGE NOTE - PLAN OF CARE
Pt cleared to be discharged home by Dr Fernandez, d/w Dr Chamorro  Labor warning signs reviewed. Patient verbalized understanding.

## 2020-12-09 NOTE — OB RN PATIENT PROFILE - ALERT: PERTINENT HISTORY
Ultra Screen at 12 Weeks/Follow up Sonogram for Growth/1st Trimester Sonogram/20 Week Level II Sonogram
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
## 2020-12-09 NOTE — OB PROVIDER TRIAGE NOTE - NSHPLABSRESULTS_GEN_ALL_CORE
Vital Signs Last 24 Hrs  T(C): 37.0 (09 Dec 2020 13:47), Max: 37 (09 Dec 2020 13:43)  T(F): 98.6 (09 Dec 2020 13:47), Max: 98.6 (09 Dec 2020 13:43)  HR: 96 (09 Dec 2020 14:54) (88 - 96)  BP: 129/67 (09 Dec 2020 14:54) (118/72 - 130/72)  BP(mean): --  RR: 16 (09 Dec 2020 13:43) (16 - 16)  SpO2: --

## 2020-12-09 NOTE — OB PROVIDER H&P - HISTORY OF PRESENT ILLNESS
[facility] patient by default. Patient receives care from [facility] in [place] with planned delivery at [facility]  31 y/o Black female, para 0010 @ 38+5 wks gestation, single IUP.  Presents to triage with c/o ctx q 5-10 min since 10 am with pinkish/brownish discharge.   Pt endorses strong fetal movement, denies any leakage of fluid.    A/P Complications: Ptyalism  Blood Transfusion: Patient will accept blood    Covid Assessment: Pt denies any: fever, chills, cough, SOB or any recent known exposure to Covid-19.    Allergies: NKDA  Meds: PNV  PMH: Sickle cell trait (FOB tested Negative)  PSH: D&C ('08)  OBgynHx    -Surgical ETOP  Uterine fibroid (unknown size/location)  Pt denies any h/o: Abn. PAP, ovarian cysts, breast problems, STDs/STIs  PSY: Denies  EtOH/ Smoke/ Recreational substance use: denies  FH: Denies  H/W/BMI: 66"/217#/35    Prenatal chart review:  GBS: Negative 20  HBsAg: NR 20; 20  RPR: NR 20; 20  Blood Type A(+) 20  HIV: NR 20; 20  Rubella: Positive 20  MFM Sono (20): Cephalic, Posterior placenta, BPP 8/8, SUBHA 16.3 cm, EFW 2703g (25%). Fibroids: 31mm X 23mm X 30mm  MFF Sono (20): Right lateral anterior wall-36mm X 16mm X 41mm

## 2020-12-09 NOTE — OB PROVIDER LABOR PROGRESS NOTE - ASSESSMENT
Plan:  - Patient placed on peanut ball  - Reevaluate for increased pain/pressure    JACKY De Los Santos, PGY1  D/w Dr. Wiley

## 2020-12-09 NOTE — OB PROVIDER H&P - NSHPPHYSICALEXAM_GEN_ALL_CORE
29 y/o Black female, para 0010 @ 38+5 wks gestation, single IUP.  Evidence of PROM   Gen: NAD; A+O x 3  Cardiac: S1/S2, R/R/R  Pulm: CTAB, unlabored breathing  Abdomen: Gravid, soft, non-tender  VS-BP: 130/72 (rpt 119/71, 118/72), P 94, RR 18, T 37.0, Pain 9/10-Pt requesting pain meds   Cat 1 tracin bpm, moderate variability, + accels, no decels  [place]: Irregular. q 5-10 min  SVE: 2/50/-3, Intact membranes  GBS Negative 20  Clinical EFW: 3000g  TAS: Cephalic 29 y/o Black female, para 0010 @ 38+5 wks gestation, single IUP.  Evidence of PROM   Gen: NAD; A+O x 3  Cardiac: S1/S2, R/R/R  Pulm: CTAB, unlabored breathing  Abdomen: Gravid, soft, non-tender  VS-BP: 130/72 (rpt 119/71, 118/72), P 94, RR 18, T 37.0, Pain 9/10-Pt requesting pain meds   Cat 1 tracin bpm, moderate variability, + accels, no decels  [place]: Irregular. q 5-10 min  SVE: 2/50/-3, ruptured/clear @ 3:15pm  GBS Negative 20  Clinical EFW: 3000g  TAS: Cephalic

## 2020-12-10 DIAGNOSIS — O42.10 PREMATURE RUPTURE OF MEMBRANES, ONSET OF LABOR MORE THAN 24 HOURS FOLLOWING RUPTURE, UNSPECIFIED WEEKS OF GESTATION: ICD-10-CM

## 2020-12-10 LAB
ALBUMIN SERPL ELPH-MCNC: 3.1 G/DL — LOW (ref 3.3–5)
ALP SERPL-CCNC: 80 U/L — SIGNIFICANT CHANGE UP (ref 40–120)
ALT FLD-CCNC: 9 U/L — SIGNIFICANT CHANGE UP (ref 4–33)
ANION GAP SERPL CALC-SCNC: 10 MMOL/L — SIGNIFICANT CHANGE UP (ref 7–14)
APTT BLD: 28.4 SEC — SIGNIFICANT CHANGE UP (ref 27–36.3)
AST SERPL-CCNC: 23 U/L — SIGNIFICANT CHANGE UP (ref 4–32)
BASOPHILS # BLD AUTO: 0.02 K/UL — SIGNIFICANT CHANGE UP (ref 0–0.2)
BASOPHILS NFR BLD AUTO: 0.2 % — SIGNIFICANT CHANGE UP (ref 0–2)
BILIRUB SERPL-MCNC: 0.3 MG/DL — SIGNIFICANT CHANGE UP (ref 0.2–1.2)
BUN SERPL-MCNC: 7 MG/DL — SIGNIFICANT CHANGE UP (ref 7–23)
CALCIUM SERPL-MCNC: 9.2 MG/DL — SIGNIFICANT CHANGE UP (ref 8.4–10.5)
CHLORIDE SERPL-SCNC: 105 MMOL/L — SIGNIFICANT CHANGE UP (ref 98–107)
CO2 SERPL-SCNC: 23 MMOL/L — SIGNIFICANT CHANGE UP (ref 22–31)
CREAT ?TM UR-MCNC: 104 MG/DL — SIGNIFICANT CHANGE UP
CREAT SERPL-MCNC: 0.69 MG/DL — SIGNIFICANT CHANGE UP (ref 0.5–1.3)
EOSINOPHIL # BLD AUTO: 0.04 K/UL — SIGNIFICANT CHANGE UP (ref 0–0.5)
EOSINOPHIL NFR BLD AUTO: 0.3 % — SIGNIFICANT CHANGE UP (ref 0–6)
FIBRINOGEN PPP-MCNC: 589 MG/DL — HIGH (ref 300–520)
GLUCOSE SERPL-MCNC: 81 MG/DL — SIGNIFICANT CHANGE UP (ref 70–99)
HCT VFR BLD CALC: 31.4 % — LOW (ref 34.5–45)
HGB BLD-MCNC: 10.4 G/DL — LOW (ref 11.5–15.5)
IANC: 9.24 K/UL — HIGH (ref 1.5–8.5)
IMM GRANULOCYTES NFR BLD AUTO: 0.4 % — SIGNIFICANT CHANGE UP (ref 0–1.5)
INR BLD: 0.99 RATIO — SIGNIFICANT CHANGE UP (ref 0.88–1.17)
LDH SERPL L TO P-CCNC: 254 U/L — HIGH (ref 135–225)
LYMPHOCYTES # BLD AUTO: 1.81 K/UL — SIGNIFICANT CHANGE UP (ref 1–3.3)
LYMPHOCYTES # BLD AUTO: 15.2 % — SIGNIFICANT CHANGE UP (ref 13–44)
MCHC RBC-ENTMCNC: 30 PG — SIGNIFICANT CHANGE UP (ref 27–34)
MCHC RBC-ENTMCNC: 33.1 GM/DL — SIGNIFICANT CHANGE UP (ref 32–36)
MCV RBC AUTO: 90.5 FL — SIGNIFICANT CHANGE UP (ref 80–100)
MONOCYTES # BLD AUTO: 0.75 K/UL — SIGNIFICANT CHANGE UP (ref 0–0.9)
MONOCYTES NFR BLD AUTO: 6.3 % — SIGNIFICANT CHANGE UP (ref 2–14)
NEUTROPHILS # BLD AUTO: 9.24 K/UL — HIGH (ref 1.8–7.4)
NEUTROPHILS NFR BLD AUTO: 77.6 % — HIGH (ref 43–77)
NRBC # BLD: 0 /100 WBCS — SIGNIFICANT CHANGE UP
NRBC # FLD: 0 K/UL — SIGNIFICANT CHANGE UP
PLATELET # BLD AUTO: 156 K/UL — SIGNIFICANT CHANGE UP (ref 150–400)
POTASSIUM SERPL-MCNC: 3.9 MMOL/L — SIGNIFICANT CHANGE UP (ref 3.5–5.3)
POTASSIUM SERPL-SCNC: 3.9 MMOL/L — SIGNIFICANT CHANGE UP (ref 3.5–5.3)
PROT ?TM UR-MCNC: 63 MG/DL — SIGNIFICANT CHANGE UP
PROT SERPL-MCNC: 5.7 G/DL — LOW (ref 6–8.3)
PROTHROM AB SERPL-ACNC: 11.4 SEC — SIGNIFICANT CHANGE UP (ref 9.8–13.1)
RBC # BLD: 3.47 M/UL — LOW (ref 3.8–5.2)
RBC # FLD: 12.3 % — SIGNIFICANT CHANGE UP (ref 10.3–14.5)
SODIUM SERPL-SCNC: 138 MMOL/L — SIGNIFICANT CHANGE UP (ref 135–145)
T PALLIDUM AB TITR SER: NEGATIVE — SIGNIFICANT CHANGE UP
URATE SERPL-MCNC: 6.4 MG/DL — SIGNIFICANT CHANGE UP (ref 2.5–7)
WBC # BLD: 11.91 K/UL — HIGH (ref 3.8–10.5)
WBC # FLD AUTO: 11.91 K/UL — HIGH (ref 3.8–10.5)

## 2020-12-10 RX ORDER — IBUPROFEN 200 MG
600 TABLET ORAL EVERY 6 HOURS
Refills: 0 | Status: DISCONTINUED | OUTPATIENT
Start: 2020-12-10 | End: 2020-12-11

## 2020-12-10 RX ADMIN — Medication 1 TABLET(S): at 11:40

## 2020-12-10 RX ADMIN — SODIUM CHLORIDE 3 MILLILITER(S): 9 INJECTION INTRAMUSCULAR; INTRAVENOUS; SUBCUTANEOUS at 06:12

## 2020-12-10 RX ADMIN — Medication 600 MILLIGRAM(S): at 17:17

## 2020-12-10 RX ADMIN — Medication 975 MILLIGRAM(S): at 16:06

## 2020-12-10 RX ADMIN — Medication 975 MILLIGRAM(S): at 00:18

## 2020-12-10 RX ADMIN — Medication 600 MILLIGRAM(S): at 18:01

## 2020-12-10 RX ADMIN — Medication 975 MILLIGRAM(S): at 01:00

## 2020-12-10 RX ADMIN — SODIUM CHLORIDE 3 MILLILITER(S): 9 INJECTION INTRAMUSCULAR; INTRAVENOUS; SUBCUTANEOUS at 14:39

## 2020-12-10 RX ADMIN — SODIUM CHLORIDE 3 MILLILITER(S): 9 INJECTION INTRAMUSCULAR; INTRAVENOUS; SUBCUTANEOUS at 23:23

## 2020-12-10 RX ADMIN — Medication 1 SPRAY(S): at 00:19

## 2020-12-10 RX ADMIN — AER TRAVELER 1 APPLICATION(S): 0.5 SOLUTION RECTAL; TOPICAL at 00:19

## 2020-12-10 RX ADMIN — Medication 975 MILLIGRAM(S): at 15:25

## 2020-12-10 RX ADMIN — Medication 975 MILLIGRAM(S): at 09:25

## 2020-12-10 RX ADMIN — Medication 975 MILLIGRAM(S): at 08:33

## 2020-12-10 RX ADMIN — Medication 600 MILLIGRAM(S): at 12:30

## 2020-12-10 RX ADMIN — Medication 600 MILLIGRAM(S): at 11:41

## 2020-12-10 NOTE — DISCHARGE NOTE OB - HOSPITAL COURSE
30y   who experienced  at 38 weeks & 6 days. She received her care at the [facility] and was scheduled to delivery at [place]. She was admitted for PROM and found to have GHTN. Labor course was uncomplicated & delivery was uncomplicated. Postpartum course was unremarkable. Patient was transferred to postpartum floor & monitored. Patient is medically optimized for discharge & instructed to follow up with the [facility] in on Monday for a blood pressure check and in 6 weeks for postpartum care.

## 2020-12-10 NOTE — DISCHARGE NOTE OB - PLAN OF CARE
recovery and return to baseline Make your follow-up appointment with your doctor as ordered. No heavy lifting, driving, or strenuous activity for 6 weeks. Nothing per vagina such as tampons, intercourse, douches or tub baths for 6 weeks or until you see your doctor. Call your doctor with any signs and symptoms of infection such as fever, chills, nausea or vomiting. Call your doctor with redness or swelling at the incision site, fluid leakage or wound separation. Call your doctor if you’re unable to tolerate food, increase in vaginal bleeding, or have difficulty urinating. Call your doctor if you have pain that is not relieved by your prescribed medications. Notify your doctor with any other concerns. Call [phone] if you have any of these concerns in the next 6 weeks.    Please schedule an appointment to see us in the Ob/Gyn clinic.   Please schedule an appointment for Monday, December 14th, for a blood pressure check.  Please schedule another appointment SIX WEEKS from discharge for a routine post partum visit.

## 2020-12-10 NOTE — LACTATION INITIAL EVALUATION - LACTATION INTERVENTIONS
Reviewed pages 35-45 with mother.  Encouraged to keep feeding log and to feed on cue.  Benefits of exclusive breastfeeding and safe skin to skin discussed./initiate/review early breastfeeding management guidelines/initiate/review techniques for position and latch/initiate skin to skin/initiate hand expression routine

## 2020-12-10 NOTE — PROGRESS NOTE ADULT - ASSESSMENT
29y/o PPD#1 s/p  in stable condition. Patient is progressing well, meeting appropriate postpartum milestones. Tolerating PO, no N/V. Ambulating without difficulty. BPs overnight s/p delivery were mild range (111-136/65-78), no symptoms of severe features this AM.

## 2020-12-10 NOTE — PROGRESS NOTE ADULT - ATTENDING COMMENTS
Associate Chief of L & D (Late entry)     I have met this patient for the first time today.  she received her care at the [facility] and was scheduled to delivery at [place] and was admitted and  found to have GHTN and delivered by    OB Progress Note:  PPD#1    S: 29yo  PPD#1 s/p . Patient denies any complaints at this time    O:  Vitals:  Vital Signs Last 24 Hrs  T(C): 37 (10 Dec 2020 14:00), Max: 37.3 (09 Dec 2020 16:41)  T(F): 98.6 (10 Dec 2020 14:00), Max: 99.14 (09 Dec 2020 16:41)  HR: 87 (10 Dec 2020 14:00) (71 - 102)  BP: 111/70 (10 Dec 2020 14:00) (111/70 - 157/84)  RR: 18 (10 Dec 2020 14:00) (18 - 20)  SpO2: 95% (10 Dec 2020 14:00) (80% - 100%)    MEDICATIONS  (STANDING):  acetaminophen   Tablet .. 975 milliGRAM(s) Oral <User Schedule>  diphtheria/tetanus/pertussis (acellular) Vaccine (ADAcel) 0.5 milliLiter(s) IntraMuscular once  ibuprofen  Tablet. 600 milliGRAM(s) Oral every 6 hours  prenatal multivitamin 1 Tablet(s) Oral daily  sodium chloride 0.9% lock flush 3 milliLiter(s) IV Push every 8 hours      Labs:  Blood type: A Positive  Rubella IgG: RPR: Negative                          10.4<L>   11.91<H> >-----------< 156    ( 12-10 @ 08:08 )             31.4<L>                        11.5   8.74 >-----------< 196    (  @ 17:50 )             36.4    12-10-20 @ 07:44      138  |  105  |  7   ----------------------------<  81  3.9   |  23  |  0.69    20 @ 17:50      140  |  104  |  8   ----------------------------<  87  4.2   |  23  |  0.58        TPro  5.7<L>  /  Alb  3.1<L>  /  TBili  0.3  /  DBili  x   /  AST  23  /  ALT  9   /  AlkPhos  80  12-10-20 @ 07:44  TPro  6.9  /  Alb  3.8  /  TBili  0.3  /  DBili  x   /  AST  17  /  ALT  9   /  AlkPhos  101  20 @ 17:50          Physical Exam:  General: NAD  Abdomen: soft, non-tender, non-distended, fundus firm  Vaginal: Lochia wnl  Extremities: No erythema/edema    A/P: 29yo PPD#1 s/p .   with GHTN  - Pain well controlled, continue current pain regimen  - Increase ambulation, SCDs when not ambulating  - Continue regular diet  - Monitor BP's    Priya Zurita M.D., M.B.A., M.S. Associate Chief of L & D (Late entry)     I have met this patient for the first time today.  she received her care at the [facility] and was scheduled to delivery at [place] and was admitted by Dr. Cortes and found to have GHTN and delivered by Dr Nassar    OB Progress Note:  PPD#1    S: 29yo  PPD#1 s/p . Patient denies any complaints at this time    O:  Vitals:  Vital Signs Last 24 Hrs  T(C): 37 (10 Dec 2020 14:00), Max: 37.3 (09 Dec 2020 16:41)  T(F): 98.6 (10 Dec 2020 14:00), Max: 99.14 (09 Dec 2020 16:41)  HR: 87 (10 Dec 2020 14:00) (71 - 102)  BP: 111/70 (10 Dec 2020 14:00) (111/70 - 157/84)  RR: 18 (10 Dec 2020 14:00) (18 - 20)  SpO2: 95% (10 Dec 2020 14:00) (80% - 100%)    MEDICATIONS  (STANDING):  acetaminophen   Tablet .. 975 milliGRAM(s) Oral <User Schedule>  diphtheria/tetanus/pertussis (acellular) Vaccine (ADAcel) 0.5 milliLiter(s) IntraMuscular once  ibuprofen  Tablet. 600 milliGRAM(s) Oral every 6 hours  prenatal multivitamin 1 Tablet(s) Oral daily  sodium chloride 0.9% lock flush 3 milliLiter(s) IV Push every 8 hours      Labs:  Blood type: A Positive  Rubella IgG: RPR: Negative                          10.4<L>   11.91<H> >-----------< 156    ( 12-10 @ 08:08 )             31.4<L>                        11.5   8.74 >-----------< 196    (  @ 17:50 )             36.4    12-10-20 @ 07:44      138  |  105  |  7   ----------------------------<  81  3.9   |  23  |  0.69    20 @ 17:50      140  |  104  |  8   ----------------------------<  87  4.2   |  23  |  0.58        TPro  5.7<L>  /  Alb  3.1<L>  /  TBili  0.3  /  DBili  x   /  AST  23  /  ALT  9   /  AlkPhos  80  12-10-20 @ 07:44  TPro  6.9  /  Alb  3.8  /  TBili  0.3  /  DBili  x   /  AST  17  /  ALT  9   /  AlkPhos  101  20 @ 17:50          Physical Exam:  General: NAD  Abdomen: soft, non-tender, non-distended, fundus firm  Vaginal: Lochia wnl  Extremities: No erythema/edema    A/P: 29yo PPD#1 s/p  and repair of 1st degree laceration who was found to have GHTN  - Pain well controlled, continue current pain regimen  - Increase ambulation, SCDs when not ambulating  - Continue regular diet  - Monitor BP's    Priya Zurita M.D., M.B.A., M.S.

## 2020-12-10 NOTE — DISCHARGE NOTE OB - MATERIALS PROVIDED
Vaccinations/Breastfeeding Log/Breastfeeding Mother’s Support Group Information/Guide to Postpartum Care/Birth Certificate Instructions/Discharge Medication Information for Patients and Families Pocket Guide/[facility] [place] Screening Program/[place] Feeding Log/[facility] Hearing Screen Program/Back To Sleep Handout/Shaken Baby Prevention Handout/Breastfeeding Guide and Packet/  Immunization Record

## 2020-12-10 NOTE — PROGRESS NOTE ADULT - PROBLEM SELECTOR PLAN 1
- Continue with po analgesia  - Increase ambulation  - Continue regular diet  - IV lock  - Hypertension in pregnancy labs wnl this AM  - Con't to monitor BPs    ARIEL Suazo, PGY-1

## 2020-12-10 NOTE — DISCHARGE NOTE OB - PROVIDER TOKENS
FREE:[LAST:[[facility]],PHONE:[phone]],FAX:[(   [phone]    -],ADDRESS:[[address] [place] [address]]]

## 2020-12-10 NOTE — DISCHARGE NOTE OB - COMMUNITY RESOURCE NAME:
Patient instructed to make a postpartum follow up appointment at the Ambulatory Care Unit at [facility]  for 4-6 weeks after her delivery date. Patient stated she will take the baby to the [facility]  for follow up 1-2 dayspost discharge. Patient instructed to make follow up appointment for the baby at [facility], Division of General Pediatrics  1-2 days after discharge if she can't secure an appointment at [facility]..

## 2020-12-10 NOTE — DISCHARGE NOTE OB - CARE PROVIDER_API CALL
[facility],   [address] [address] [place] [address]  Phone: [phone]  Fax: (   )    -  Follow Up Time:

## 2020-12-10 NOTE — DISCHARGE NOTE OB - PATIENT PORTAL LINK FT
You can access the FollowMyHealth Patient Portal offered by [facility] by registering at the following website: [email or web address]. By joining [facility]’s FollowMyHealth portal, you will also be able to view your health information using other applications (apps) compatible with our system.

## 2020-12-10 NOTE — LACTATION INITIAL EVALUATION - INTERVENTION OUTCOME
good return demonstration/demonstrates understanding of teaching/Lactation team to follow up/needs not met/verbalizes understanding

## 2020-12-10 NOTE — DISCHARGE NOTE OB - ADDITIONAL INSTRUCTIONS
Make your follow-up appointment with your doctor as ordered. No heavy lifting, driving, or strenuous activity for 6 weeks. Nothing per vagina such as tampons, intercourse, douches or tub baths for 6 weeks or until you see your doctor. Call your doctor with any signs and symptoms of infection such as fever, chills, nausea or vomiting. Call your doctor with redness or swelling at the incision site, fluid leakage or wound separation. Call your doctor if you’re unable to tolerate food, increase in vaginal bleeding, or have difficulty urinating. Call your doctor if you have pain that is not relieved by your prescribed medications. Notify your doctor with any other concerns. Call [phone] if you have any of these concerns in the next 6 weeks.    Please schedule an appointment to see us in the Ob/Gyn clinic.   Please schedule an appointment for Monday, December 14th, for a blood pressure check.  Please schedule another appointment SIX WEEKS from discharge for a routine post partum visit.

## 2020-12-10 NOTE — DISCHARGE NOTE OB - CARE PLAN
Principal Discharge DX:	 (normal spontaneous vaginal delivery)  Goal:	recovery and return to baseline  Assessment and plan of treatment:	Make your follow-up appointment with your doctor as ordered. No heavy lifting, driving, or strenuous activity for 6 weeks. Nothing per vagina such as tampons, intercourse, douches or tub baths for 6 weeks or until you see your doctor. Call your doctor with any signs and symptoms of infection such as fever, chills, nausea or vomiting. Call your doctor with redness or swelling at the incision site, fluid leakage or wound separation. Call your doctor if you’re unable to tolerate food, increase in vaginal bleeding, or have difficulty urinating. Call your doctor if you have pain that is not relieved by your prescribed medications. Notify your doctor with any other concerns. Call [phone] if you have any of these concerns in the next 6 weeks.    Please schedule an appointment to see us in the Ob/Gyn clinic.   Please schedule an appointment for Monday, , for a blood pressure check.  Please schedule another appointment SIX WEEKS from discharge for a routine post partum visit.

## 2020-12-10 NOTE — DISCHARGE NOTE OB - MEDICATION SUMMARY - MEDICATIONS TO TAKE
I will START or STAY ON the medications listed below when I get home from the hospital:    Blood Pressure Cuff  -- 1 application compounding 3 times a day   -- Indication: For Gestational hypertension    acetaminophen 325 mg oral tablet  -- 3 tab(s) by mouth   -- Indication: For  (normal spontaneous vaginal delivery)    ibuprofen 600 mg oral tablet  -- 1 tab(s) by mouth every 6 hours  -- Indication: For  (normal spontaneous vaginal delivery)

## 2020-12-11 VITALS
TEMPERATURE: 98 F | RESPIRATION RATE: 18 BRPM | SYSTOLIC BLOOD PRESSURE: 128 MMHG | HEART RATE: 75 BPM | OXYGEN SATURATION: 99 % | DIASTOLIC BLOOD PRESSURE: 82 MMHG

## 2020-12-11 DIAGNOSIS — O13.9 GESTATIONAL [PREGNANCY-INDUCED] HYPERTENSION WITHOUT SIGNIFICANT PROTEINURIA, UNSPECIFIED TRIMESTER: ICD-10-CM

## 2020-12-11 RX ORDER — IBUPROFEN 200 MG
1 TABLET ORAL
Qty: 0 | Refills: 0 | DISCHARGE
Start: 2020-12-11

## 2020-12-11 RX ORDER — ACETAMINOPHEN 500 MG
3 TABLET ORAL
Qty: 0 | Refills: 0 | DISCHARGE
Start: 2020-12-11

## 2020-12-11 RX ADMIN — Medication 975 MILLIGRAM(S): at 08:52

## 2020-12-11 RX ADMIN — Medication 1 TABLET(S): at 13:13

## 2020-12-11 RX ADMIN — Medication 600 MILLIGRAM(S): at 04:50

## 2020-12-11 RX ADMIN — SODIUM CHLORIDE 3 MILLILITER(S): 9 INJECTION INTRAMUSCULAR; INTRAVENOUS; SUBCUTANEOUS at 05:11

## 2020-12-11 RX ADMIN — Medication 975 MILLIGRAM(S): at 09:25

## 2020-12-11 RX ADMIN — Medication 600 MILLIGRAM(S): at 13:13

## 2020-12-11 RX ADMIN — Medication 600 MILLIGRAM(S): at 04:23

## 2020-12-11 RX ADMIN — Medication 600 MILLIGRAM(S): at 14:45

## 2020-12-11 NOTE — PROGRESS NOTE ADULT - PROBLEM SELECTOR PLAN 2
- patient met criteria based on BPs since admission  - BPs well controlled at this time  - no s/s of severe features   - patient given rx for BP cuff, counseled extensively on at home BP monitoring and need for close follow up for BP checks    Phoebe Reed MD PGY1

## 2020-12-11 NOTE — PROGRESS NOTE ADULT - PROBLEM SELECTOR PLAN 1
- Pain well controlled, continue current pain regimen  - Increase ambulation, SCDs when not ambulating  - Continue regular diet  - Declines Birth control  - Discharge planning

## 2020-12-11 NOTE — PROGRESS NOTE ADULT - SUBJECTIVE AND OBJECTIVE BOX
OB Progress Note:  PPD#2    S: 31yo PPD#2 s/p  course complicated by gHTN. Patient feels well. Pain is well controlled. She is tolerating a regular diet. She is voiding spontaneously, and ambulating without difficulty. Denies CP/SOB. Denies lightheadedness/dizziness. Denies N/V. Denies HA, RUQ pain, blurry vision.     O:  Vitals:   Vital Signs Last 24 Hrs  T(C): 36.8 (11 Dec 2020 06:30), Max: 37 (10 Dec 2020 14:00)  T(F): 98.2 (11 Dec 2020 06:30), Max: 98.6 (10 Dec 2020 14:00)  HR: 75 (11 Dec 2020 06:30) (71 - 87)  BP: 128/82 (11 Dec 2020 06:30) (111/70 - 128/82)  BP(mean): --  RR: 18 (11 Dec 2020 06:30) (17 - 18)  SpO2: 99% (11 Dec 2020 06:30) (95% - 100%)    MEDICATIONS  (STANDING):  acetaminophen   Tablet .. 975 milliGRAM(s) Oral <User Schedule>  diphtheria/tetanus/pertussis (acellular) Vaccine (ADAcel) 0.5 milliLiter(s) IntraMuscular once  ibuprofen  Tablet. 600 milliGRAM(s) Oral every 6 hours  prenatal multivitamin 1 Tablet(s) Oral daily  sodium chloride 0.9% lock flush 3 milliLiter(s) IV Push every 8 hours    MEDICATIONS  (PRN):  benzocaine 20%/menthol 0.5% Spray 1 Spray(s) Topical every 6 hours PRN for Perineal discomfort  dibucaine 1% Ointment 1 Application(s) Topical every 6 hours PRN Perineal discomfort  diphenhydrAMINE 25 milliGRAM(s) Oral every 6 hours PRN Pruritus  hydrocortisone 1% Cream 1 Application(s) Topical every 6 hours PRN Moderate Pain (4-6)  lanolin Ointment 1 Application(s) Topical every 6 hours PRN nipple soreness  magnesium hydroxide Suspension 30 milliLiter(s) Oral two times a day PRN Constipation  oxyCODONE    IR 5 milliGRAM(s) Oral every 3 hours PRN Moderate to Severe Pain (4-10)  oxyCODONE    IR 5 milliGRAM(s) Oral once PRN Moderate to Severe Pain (4-10)  pramoxine 1%/zinc 5% Cream 1 Application(s) Topical every 4 hours PRN Moderate Pain (4-6)  simethicone 80 milliGRAM(s) Chew every 4 hours PRN Gas  witch hazel Pads 1 Application(s) Topical every 4 hours PRN Perineal discomfort      Labs:  Blood type: A Positive  Rubella IgG: RPR: Negative                          10.4<L>   11.91<H> >-----------< 156    ( 12-10 @ 08:08 )             31.4<L>                        11.5   8.74 >-----------< 196    (  @ 17:50 )             36.4    12-10-20 @ 07:44      138  |  105  |  7   ----------------------------<  81  3.9   |  23  |  0.69    20 @ 17:50      140  |  104  |  8   ----------------------------<  87  4.2   |  23  |  0.58        Ca    9.2      10 Dec 2020 07:44  Ca    9.6      09 Dec 2020 17:50    TPro  5.7<L>  /  Alb  3.1<L>  /  TBili  0.3  /  DBili  x   /  AST  23  /  ALT  9   /  AlkPhos  80  12-10-20 @ 07:44  TPro  6.9  /  Alb  3.8  /  TBili  0.3  /  DBili  x   /  AST  17  /  ALT  9   /  AlkPhos  101  20 @ 17:50          Physical Exam:  General: NAD  CV: RRR  Resp: CTAB  Abdomen: soft, non-tender, non-distended, fundus firm  Extremities: No erythema/edema

## 2020-12-11 NOTE — PROGRESS NOTE ADULT - ATTENDING COMMENTS
OB Progress Note:  PPD#2    S: 29yo  PPD#2 s/p . Patient denies any complaints at this time    O:  Vitals: Vital Signs Last 24 Hrs  T(C): 36.8 (11 Dec 2020 06:30), Max: 37 (10 Dec 2020 14:00)  T(F): 98.2 (11 Dec 2020 06:30), Max: 98.6 (10 Dec 2020 14:00)  HR: 75 (11 Dec 2020 06:30) (75 - 87)  BP: 128/82 (11 Dec 2020 06:30) (111/70 - 128/82)  RR: 18 (11 Dec 2020 06:30) (17 - 18)  SpO2: 99% (11 Dec 2020 06:30) (95% - 99%)      MEDICATIONS  (STANDING):  acetaminophen   Tablet .. 975 milliGRAM(s) Oral <User Schedule>  diphtheria/tetanus/pertussis (acellular) Vaccine (ADAcel) 0.5 milliLiter(s) IntraMuscular once  ibuprofen  Tablet. 600 milliGRAM(s) Oral every 6 hours  prenatal multivitamin 1 Tablet(s) Oral daily        Labs:  Blood type: A Positive  Rubella IgG: RPR: Negative                          10.4<L>   11.91<H> >-----------< 156    ( 12-10 @ 08:08 )             31.4<L>                        11.5   8.74 >-----------< 196    (  @ 17:50 )             36.4    12-10-20 @ 07:44      138  |  105  |  7   ----------------------------<  81  3.9   |  23  |  0.69    20 @ 17:50      140  |  104  |  8   ----------------------------<  87  4.2   |  23  |  0.58        Physical Exam:  General: NAD  Abdomen: soft, fundus firm, NT, ND  Vaginal: Lochia wnl  Extremities: No erythema/edema    A/P: 29yo PPD#2  s/p  and repair of 1st degree laceration who was found to have GHTN  - Patient is stable for discharge and william follow up on Monday for BP check-  She was given parameters to monitor hewr BP's  Priya Fadi Hussein M.D., M.B.A., M.S.

## 2020-12-11 NOTE — PROGRESS NOTE ADULT - ASSESSMENT
A/P: 29yo PPD#2 s/p  course c/b gHTN.  Patient is stable and doing well post-partum. Blood pressures within normal limits at this time.

## 2020-12-14 ENCOUNTER — NON-APPOINTMENT (OUTPATIENT)
Age: 30
End: 2020-12-14

## 2020-12-14 PROBLEM — Z00.00 ENCOUNTER FOR PREVENTIVE HEALTH EXAMINATION: Status: ACTIVE | Noted: 2020-12-14

## 2020-12-20 ENCOUNTER — NON-APPOINTMENT (OUTPATIENT)
Age: 30
End: 2020-12-20

## 2020-12-31 ENCOUNTER — NON-APPOINTMENT (OUTPATIENT)
Age: 30
End: 2020-12-31

## 2021-01-11 RX ORDER — PRENATAL VIT NO.130/IRON/FOLIC 27MG-0.8MG
27-0.8 TABLET ORAL
Refills: 0 | Status: ACTIVE | COMMUNITY
Start: 2021-01-11

## 2021-04-23 NOTE — OB PROVIDER TRIAGE NOTE - NS_FETALHEARTHEAR_OBGYN_ALL_OB
Discharge instructions given to mom. Pt. Tolerating po and acting age appropriate. Pt. Ok for dc per er md.Mom denies any questions at this time.    Yes

## 2022-05-10 NOTE — OB PROVIDER H&P - NS_BABIESUTERO_OBGYN_ALL_OB_NU
1 O-Z Plasty Text: The defect edges were debeveled with a #15 scalpel blade.  Given the location of the defect, shape of the defect and the proximity to free margins an O-Z plasty (double transposition flap) was deemed most appropriate.  Using a sterile surgical marker, the appropriate transposition flaps were drawn incorporating the defect and placing the expected incisions within the relaxed skin tension lines where possible.    The area thus outlined was incised deep to adipose tissue with a #15 scalpel blade.  The skin margins were undermined to an appropriate distance in all directions utilizing iris scissors.  Hemostasis was achieved with electrocautery.  The flaps were then transposed into place, one clockwise and the other counterclockwise, and anchored with interrupted buried subcutaneous sutures.

## 2023-04-03 NOTE — OB PROVIDER TRIAGE NOTE - NS_CTXBYPALP_OBGYN_ALL_OB
Refill request for  gabapentin 800 mg  medication. Name of [facility]acy-  [facility]       Last visit - 3/30/2023     Pending visit - 9/29/2023    Last refill - 3/6/2023      Medication Contract signed - PDMP Monitoring:    Last PDMP Marya Gaines as Reviewed:  Review User Review Instant Review Result   Montse Gomez 7/19/2021  8:51 AM Reviewed PDMP [1]     [unfilled]  Urine Drug Screenings (1 yr)    No resulted procedures found.        Medication Contract and Consent for Opioid Use Documents Filed        No documents found                    Last Itzel hicks-          Additional Comments None felt

## 2023-04-25 NOTE — OB RN TRIAGE NOTE - NS_TRIAGETIMEOF NOTIFICATION_OBGYN_ALL_OB_DT
Detail Level: Detailed Size Of Lesion In Cm (Optional): 0.9 X Size Of Lesion In Cm (Optional): 0 Morphology Per Location (Optional): Brown patch Size Of Lesion In Cm (Optional): 1.2 X Size Of Lesion In Cm (Optional): 0.5 Morphology Per Location (Optional): Light brown patch 15-Nov-2020 15:45

## 2024-01-26 ENCOUNTER — TRANSCRIPTION ENCOUNTER (OUTPATIENT)
Age: 34
End: 2024-01-26

## 2024-01-26 ENCOUNTER — INPATIENT (INPATIENT)
Facility: HOSPITAL | Age: 34
LOS: 1 days | Discharge: HOME | End: 2024-01-28
Attending: SPECIALIST | Admitting: SPECIALIST
Payer: MEDICAID

## 2024-01-26 VITALS
SYSTOLIC BLOOD PRESSURE: 94 MMHG | RESPIRATION RATE: 16 BRPM | HEART RATE: 100 BPM | HEIGHT: 66 IN | DIASTOLIC BLOOD PRESSURE: 53 MMHG | TEMPERATURE: 98 F | WEIGHT: 229.94 LBS

## 2024-01-26 DIAGNOSIS — Z33.2 ENCOUNTER FOR ELECTIVE TERMINATION OF PREGNANCY: Chronic | ICD-10-CM

## 2024-01-26 DIAGNOSIS — O26.899 OTHER SPECIFIED PREGNANCY RELATED CONDITIONS, UNSPECIFIED TRIMESTER: ICD-10-CM

## 2024-01-26 LAB
BASOPHILS # BLD AUTO: 0.02 K/UL — SIGNIFICANT CHANGE UP (ref 0–0.2)
BASOPHILS NFR BLD AUTO: 0.2 % — SIGNIFICANT CHANGE UP (ref 0–2)
BLD GP AB SCN SERPL QL: NEGATIVE — SIGNIFICANT CHANGE UP
BLD GP AB SCN SERPL QL: NEGATIVE — SIGNIFICANT CHANGE UP
EOSINOPHIL # BLD AUTO: 0 K/UL — SIGNIFICANT CHANGE UP (ref 0–0.5)
EOSINOPHIL NFR BLD AUTO: 0 % — SIGNIFICANT CHANGE UP (ref 0–6)
HCT VFR BLD CALC: 33.7 % — LOW (ref 34.5–45)
HGB BLD-MCNC: 11 G/DL — LOW (ref 11.5–15.5)
IANC: 7.59 K/UL — HIGH (ref 1.8–7.4)
IMM GRANULOCYTES NFR BLD AUTO: 0.5 % — SIGNIFICANT CHANGE UP (ref 0–0.9)
LYMPHOCYTES # BLD AUTO: 1.18 K/UL — SIGNIFICANT CHANGE UP (ref 1–3.3)
LYMPHOCYTES # BLD AUTO: 12.7 % — LOW (ref 13–44)
MCHC RBC-ENTMCNC: 28.4 PG — SIGNIFICANT CHANGE UP (ref 27–34)
MCHC RBC-ENTMCNC: 32.6 GM/DL — SIGNIFICANT CHANGE UP (ref 32–36)
MCV RBC AUTO: 86.9 FL — SIGNIFICANT CHANGE UP (ref 80–100)
MONOCYTES # BLD AUTO: 0.43 K/UL — SIGNIFICANT CHANGE UP (ref 0–0.9)
MONOCYTES NFR BLD AUTO: 4.6 % — SIGNIFICANT CHANGE UP (ref 2–14)
NEUTROPHILS # BLD AUTO: 7.59 K/UL — HIGH (ref 1.8–7.4)
NEUTROPHILS NFR BLD AUTO: 82 % — HIGH (ref 43–77)
NRBC # BLD: 0 /100 WBCS — SIGNIFICANT CHANGE UP (ref 0–0)
NRBC # FLD: 0 K/UL — SIGNIFICANT CHANGE UP (ref 0–0)
PLATELET # BLD AUTO: 236 K/UL — SIGNIFICANT CHANGE UP (ref 150–400)
RBC # BLD: 3.88 M/UL — SIGNIFICANT CHANGE UP (ref 3.8–5.2)
RBC # FLD: 13.2 % — SIGNIFICANT CHANGE UP (ref 10.3–14.5)
RH IG SCN BLD-IMP: POSITIVE — SIGNIFICANT CHANGE UP
RH IG SCN BLD-IMP: POSITIVE — SIGNIFICANT CHANGE UP
WBC # BLD: 9.27 K/UL — SIGNIFICANT CHANGE UP (ref 3.8–10.5)
WBC # FLD AUTO: 9.27 K/UL — SIGNIFICANT CHANGE UP (ref 3.8–10.5)

## 2024-01-26 PROCEDURE — 59409 OBSTETRICAL CARE: CPT | Mod: U7

## 2024-01-26 RX ORDER — ACETAMINOPHEN 500 MG
3 TABLET ORAL
Qty: 0 | Refills: 0 | DISCHARGE
Start: 2024-01-26

## 2024-01-26 RX ORDER — HYDROCORTISONE 1 %
1 OINTMENT (GRAM) TOPICAL EVERY 6 HOURS
Refills: 0 | Status: DISCONTINUED | OUTPATIENT
Start: 2024-01-26 | End: 2024-01-28

## 2024-01-26 RX ORDER — OXYCODONE HYDROCHLORIDE 5 MG/1
5 TABLET ORAL
Refills: 0 | Status: DISCONTINUED | OUTPATIENT
Start: 2024-01-26 | End: 2024-01-28

## 2024-01-26 RX ORDER — CHLORHEXIDINE GLUCONATE 213 G/1000ML
1 SOLUTION TOPICAL DAILY
Refills: 0 | Status: DISCONTINUED | OUTPATIENT
Start: 2024-01-26 | End: 2024-01-26

## 2024-01-26 RX ORDER — KETOROLAC TROMETHAMINE 30 MG/ML
30 SYRINGE (ML) INJECTION ONCE
Refills: 0 | Status: DISCONTINUED | OUTPATIENT
Start: 2024-01-26 | End: 2024-01-26

## 2024-01-26 RX ORDER — DIBUCAINE 1 %
1 OINTMENT (GRAM) RECTAL EVERY 6 HOURS
Refills: 0 | Status: DISCONTINUED | OUTPATIENT
Start: 2024-01-26 | End: 2024-01-28

## 2024-01-26 RX ORDER — SIMETHICONE 80 MG/1
80 TABLET, CHEWABLE ORAL EVERY 4 HOURS
Refills: 0 | Status: DISCONTINUED | OUTPATIENT
Start: 2024-01-26 | End: 2024-01-28

## 2024-01-26 RX ORDER — DIPHENHYDRAMINE HCL 50 MG
25 CAPSULE ORAL EVERY 6 HOURS
Refills: 0 | Status: DISCONTINUED | OUTPATIENT
Start: 2024-01-26 | End: 2024-01-28

## 2024-01-26 RX ORDER — ACETAMINOPHEN 500 MG
975 TABLET ORAL
Refills: 0 | Status: DISCONTINUED | OUTPATIENT
Start: 2024-01-26 | End: 2024-01-28

## 2024-01-26 RX ORDER — IBUPROFEN 200 MG
1 TABLET ORAL
Qty: 0 | Refills: 0 | DISCHARGE
Start: 2024-01-26

## 2024-01-26 RX ORDER — PRAMOXINE HYDROCHLORIDE 150 MG/15G
1 AEROSOL, FOAM RECTAL EVERY 4 HOURS
Refills: 0 | Status: DISCONTINUED | OUTPATIENT
Start: 2024-01-26 | End: 2024-01-28

## 2024-01-26 RX ORDER — OXYTOCIN 10 UNIT/ML
333.33 VIAL (ML) INJECTION
Qty: 20 | Refills: 0 | Status: DISCONTINUED | OUTPATIENT
Start: 2024-01-26 | End: 2024-01-26

## 2024-01-26 RX ORDER — LANOLIN
1 OINTMENT (GRAM) TOPICAL
Qty: 0 | Refills: 0 | DISCHARGE
Start: 2024-01-26

## 2024-01-26 RX ORDER — AER TRAVELER 0.5 G/1
1 SOLUTION RECTAL; TOPICAL EVERY 4 HOURS
Refills: 0 | Status: DISCONTINUED | OUTPATIENT
Start: 2024-01-26 | End: 2024-01-28

## 2024-01-26 RX ORDER — MAGNESIUM HYDROXIDE 400 MG/1
30 TABLET, CHEWABLE ORAL
Refills: 0 | Status: DISCONTINUED | OUTPATIENT
Start: 2024-01-26 | End: 2024-01-28

## 2024-01-26 RX ORDER — OXYTOCIN 10 UNIT/ML
41.67 VIAL (ML) INJECTION
Qty: 20 | Refills: 0 | Status: DISCONTINUED | OUTPATIENT
Start: 2024-01-26 | End: 2024-01-28

## 2024-01-26 RX ORDER — BENZOCAINE 10 %
1 GEL (GRAM) MUCOUS MEMBRANE EVERY 6 HOURS
Refills: 0 | Status: DISCONTINUED | OUTPATIENT
Start: 2024-01-26 | End: 2024-01-28

## 2024-01-26 RX ORDER — IBUPROFEN 200 MG
600 TABLET ORAL EVERY 6 HOURS
Refills: 0 | Status: COMPLETED | OUTPATIENT
Start: 2024-01-26 | End: 2024-12-24

## 2024-01-26 RX ORDER — SODIUM CHLORIDE 9 MG/ML
1000 INJECTION, SOLUTION INTRAVENOUS
Refills: 0 | Status: DISCONTINUED | OUTPATIENT
Start: 2024-01-26 | End: 2024-01-26

## 2024-01-26 RX ORDER — CITRIC ACID/SODIUM CITRATE 300-500 MG
15 SOLUTION, ORAL ORAL EVERY 6 HOURS
Refills: 0 | Status: DISCONTINUED | OUTPATIENT
Start: 2024-01-26 | End: 2024-01-26

## 2024-01-26 RX ORDER — LANOLIN
1 OINTMENT (GRAM) TOPICAL EVERY 6 HOURS
Refills: 0 | Status: DISCONTINUED | OUTPATIENT
Start: 2024-01-26 | End: 2024-01-28

## 2024-01-26 RX ORDER — IBUPROFEN 200 MG
600 TABLET ORAL EVERY 6 HOURS
Refills: 0 | Status: DISCONTINUED | OUTPATIENT
Start: 2024-01-26 | End: 2024-01-28

## 2024-01-26 RX ORDER — SODIUM CHLORIDE 9 MG/ML
3 INJECTION INTRAMUSCULAR; INTRAVENOUS; SUBCUTANEOUS EVERY 8 HOURS
Refills: 0 | Status: DISCONTINUED | OUTPATIENT
Start: 2024-01-26 | End: 2024-01-28

## 2024-01-26 RX ORDER — OXYCODONE HYDROCHLORIDE 5 MG/1
5 TABLET ORAL ONCE
Refills: 0 | Status: DISCONTINUED | OUTPATIENT
Start: 2024-01-26 | End: 2024-01-28

## 2024-01-26 RX ORDER — TETANUS TOXOID, REDUCED DIPHTHERIA TOXOID AND ACELLULAR PERTUSSIS VACCINE, ADSORBED 5; 2.5; 8; 8; 2.5 [IU]/.5ML; [IU]/.5ML; UG/.5ML; UG/.5ML; UG/.5ML
0.5 SUSPENSION INTRAMUSCULAR ONCE
Refills: 0 | Status: DISCONTINUED | OUTPATIENT
Start: 2024-01-26 | End: 2024-01-28

## 2024-01-26 RX ADMIN — Medication 975 MILLIGRAM(S): at 21:10

## 2024-01-26 RX ADMIN — Medication 30 MILLIGRAM(S): at 18:22

## 2024-01-26 RX ADMIN — Medication 125 MILLIUNIT(S)/MIN: at 18:22

## 2024-01-26 RX ADMIN — Medication 975 MILLIGRAM(S): at 21:40

## 2024-01-26 NOTE — DISCHARGE NOTE OB - PATIENT PORTAL LINK FT
You can access the FollowMyHealth Patient Portal offered by [facility] by registering at the following website: [email or web address]. By joining [facility]’s FollowMyHealth portal, you will also be able to view your health information using other applications (apps) compatible with our system.

## 2024-01-26 NOTE — DISCHARGE NOTE OB - PROVIDER TOKENS
FREE:[LAST:[Samuel],FIRST:[Demian],PHONE:[phone]],FAX:[(   )    -],ADDRESS:[[place] [address]],FOLLOWUP:[1 month],ESTABLISHEDPATIENT:[T]]

## 2024-01-26 NOTE — OB RN DELIVERY SUMMARY - NSSELHIDDEN_OBGYN_ALL_OB_FT
[NS_DeliveryAttending1_OBGYN_ALL_OB_FT:[id number]],[NS_DeliveryAssist1_OBGYN_ALL_OB_FT:[id number]=],[NS_DeliveryRN_OBGYN_ALL_OB_FT:[id number]=]

## 2024-01-26 NOTE — OB PROVIDER DELIVERY SUMMARY - NSPROVIDERDELIVERYNOTE_OBGYN_ALL_OB_FT
Pt fully and pushing    Delivery of a Viable female infant in the LINDA position. Head, shoulders and body delivered easily. Infant was passed to mother. Delayed cord clamping and cut. Placenta delivered spontaneously and intact with a 3 vessel cord. Fundal massage was given and uterine fundus was found to be firm. Vaginal exam revealed an intact cervix, vaginal walls and sulci. Patient had a 1st degree perineal laceration which was repaired with a 2.0 chromic in normal fashion. Excellent hemostasis was noted. Patient and infant in stable condition. Count was correct x 2.    EBL: 156ml Pt fully and pushing    Delivery of a Viable female infant in the LINDA position. Head, shoulders and body delivered easily. Infant was passed to mother. Delayed cord clamping and cut. Placenta delivered spontaneously and intact with a 3 vessel cord. Fundal massage was given and uterine fundus was found to be firm. Vaginal exam revealed an intact cervix, vaginal walls and sulci. Patient had a 1st degree perineal laceration which was repaired with a 2.0 chromic in normal fashion. Excellent hemostasis was noted. Patient and infant in stable condition. Count was correct x 2.    EBL: 156ml  laceration: 1st degree    viable female, weight 3335g, APGARS 9/9    I participated in the delivery and agree with the summary above  Rose Marie King CNM

## 2024-01-26 NOTE — OB RN PATIENT PROFILE - NS_OBGYNHISTORY_OBGYN_ALL_OB_FT
2020-  6#10oz uncomplicated  2010- TOP x1 with D&C  MAB x1 (unk year)    GYN:  Fibroids- unk location/size patient reports "they didn't see it this time"

## 2024-01-26 NOTE — DISCHARGE NOTE OB - HOSPITAL COURSE
Patient was admitted to L+D for labor at term. Pt had an uncomplicated  followed by an uncomplicated postpartum course.  QBL: 156  Hct: 33.7  On day of discharge, patient was discharged home in stable condition, voiding spontaneously, pain well controlled, ambulating, tolerating PO and with normal vital signs. Patient declined postpartum birth control. Pt plans to follow up with her primary OB/GYN Dr. Demian Baker in 4-6 weeks. Telephone number and clinic information provided prior to discharge.  Patient was admitted to L+D for labor at term. Pt had an uncomplicated  followed by an uncomplicated postpartum course.  QBL: 156  Hct: 33.7->29.9  On day of discharge, patient was discharged home in stable condition, voiding spontaneously, pain well controlled, ambulating, tolerating PO and with normal vital signs. Patient declined postpartum birth control. Pt plans to follow up with her primary OB/GYN Dr. Demian Baker in 4-6 weeks. Telephone number and clinic information provided prior to discharge.

## 2024-01-26 NOTE — DISCHARGE NOTE OB - PROCEDURE SELECTOR
Problem follow up:  Sahil Abbott returns for follow up visit regarding hosp stay and rehad.  he was seen 6 days ago in rehab diagnosed with vertigo and treated with PT OT. Workup was significant for not seen. Notes, labs, studies, imaging related to this problem during prior visit were available . Since that visit, he has improved. he has been compliant with prescribed treatment. Residual symptoms include: balance ataxia confusion sundowning wandering in apartment  New issues associated with this problem include: . Sleeps better with seroquel  Many falls  In PT at [facility]  wr for facial bruise neg xrays    Patient Active Problem List    Diagnosis    Dysthymia    Depression     Seeing DR [facility] psychiatry      Osteopenia    BRYANT on CPAP     Partially compliant        BPH (benign prostatic hyperplasia)    Personal history of colon cancer, stage III     1 positive node      chemorx for 6 months      GÓMEZ (mycobacterium avium-intracellulare) ([facility]    CAD (coronary artery disease)    COPD (chronic obstructive pulmonary disease) (HCC)     Asthma        HTN (hypertension)    Melanoma ([facility] [facility]   left dace  Swelling minimal    Using walker for ambulation  He is very distractable and cannot follow instructions  Lungs clear  Many bruises  Left shoulder rom reduced    Vitals:    10/24/17 1500   BP: (!) 147/93   Pulse: 64   Temp: 98 °F (36.7 °C)   TempSrc: Oral   SpO2: 95%   Weight: 148 lb (67.1 kg)   Height: 5' 10\" (1.778 m)         Vitals:    10/24/17 1500   BP: (!) 147/93   Pulse: 64   Temp: 98 °F (36.7 °C)   TempSrc: Oral   SpO2: 95%   Weight: 148 lb (67.1 kg)   Height: 5' 10\" (1.778 m)     Alert confused repeats facial bruise left face  Chest clear  Rhythm reg  Neuro non focal  Gait with cane fair                1. Alzheimer's disease of other onset with behavioral disturbance  Declining may need no walking    2. Chronic bronchitis, unspecified chronic bronchitis type ([facility]  stable    3. Multiple falls  Have no answers 1

## 2024-01-26 NOTE — OB PROVIDER H&P - NS_OBGYNHISTORY_OBGYN_ALL_OB_FT
2020-  6#10oz uncomplicated  2010- TOP x1 with D&C  MAB x1 (unk year)    GYN:  Fibroids- unk location/size OB Hx:  2020-  6#10oz c/b gHTN  2010- TOP x1 with D&C  MAB x1 (unk year)    GYN:  Fibroids- unk location/size

## 2024-01-26 NOTE — DISCHARGE NOTE OB - MEDICATION SUMMARY - MEDICATIONS TO TAKE
I will START or STAY ON the medications listed below when I get home from the hospital:    ibuprofen 600 mg oral tablet  -- 1 tab(s) by mouth every 6 hours as needed for  mild pain  -- Indication: For pain    acetaminophen 325 mg oral tablet  -- 3 tab(s) by mouth every 6 hours as needed for  mild pain  -- Indication: For pain    lanolin topical ointment  -- 1 Apply on skin to affected area every 6 hours As needed nipple soreness  -- Indication: For Nipple soreness    PNV Prenatal oral tablet  -- 1 tab(s) by mouth once a day  -- Indication: For Nutrition

## 2024-01-26 NOTE — OB RN DELIVERY SUMMARY - NS_SEPSISRSKCALC_OBGYN_ALL_OB_FT
EOS calculated successfully. EOS Risk Factor: 0.5/1000 live births ([facility] national incidence); GA=38w3d; Temp=98.1; ROM=0.2; GBS='Unknown'; Antibiotics='No antibiotics or any antibiotics < 2 hrs prior to birth'

## 2024-01-26 NOTE — DISCHARGE NOTE OB - ADDITIONAL INSTRUCTIONS
Make your follow-up appointment with your doctor as ordered.   No heavy lifting, driving, or strenuous activity for 6 weeks.  Nothing per vagina such as tampons, intercourse, douches or tub baths for 6 weeks or until you see your doctor.  Call your doctor if you're unable to tolerate food, increase in vaginal bleeding, or have difficulty urinating. Call your doctor if you have pain that is not relieved by your prescribed medications. Notify your doctor with any other concerns.    Please f/u in 4-6 weeks with Dr. Demian Baker

## 2024-01-26 NOTE — OB PROVIDER H&P - LABOR: FETAL STATION
Niacinamide Counseling: I recommended taking niacin or niacinamide, also know as vitamin B3, twice daily. Recent evidence suggests that taking vitamin B3 (500 mg twice daily) can reduce the risk of actinic keratoses and non-melanoma skin cancers. Side effects of vitamin B3 include flushing and headache. -3

## 2024-01-26 NOTE — DISCHARGE NOTE OB - NS MD DC FALL RISK RISK
For information on Fall & Injury Prevention, visit: [email or web address] OR  [email or web address] OR  https://www.cdc.gov/steadi/patient.html

## 2024-01-26 NOTE — OB PROVIDER H&P - NSLOWPPHRISK_OBGYN_A_OB
No previous uterine incision/[place] Pregnancy/Less than or equal to 4 previous vaginal births/No known bleeding disorder/No history of postpartum hemorrhage/No other PPH risks indicated

## 2024-01-26 NOTE — DISCHARGE NOTE OB - CARE PROVIDER_API CALL
[place] [address]  Phone: [phone]  Fax: (   )    -  Established Patient  Follow Up Time: 1 month

## 2024-01-26 NOTE — OB PROVIDER H&P - ASSESSMENT
A/P: Pt is a 32yo  @38.3wks, STEPHANIE: 24, who presents with c/o of contractions every 3 minutes, rating her pain 9/10 since 10am. This is a default patient who receives her care at [facility] with Dr. Demian Baker. Patient to be admitted in labor.     1. Admit to LND. Routine Labs. IVF  2. Expectant Management   3. Fetus: Cat 1 tracing, Vertex, EFW 3100g  . C/w EFM.  4. Default labor patient- [facility] with Dr. Demian Baker- Urine Tox obtained   5. GBS negative per patient, hard copy not available   6. Pain: Epidural PRN    PETRA Marsh  Discussed with Dr. Turcios

## 2024-01-26 NOTE — OB PROVIDER DELIVERY SUMMARY - NS_PLACENTA_OBGYN_ALL_OB_DT
Procedure:  EGD/colonoscopy with MAC ordered by Khadra Isbell PA-C  Provider:  Dr Blackmon  Location:  [place]  Date/Time:  6/15/2023 8am  Diagnosis:  Colon cancer screening, lower abdominal cramping, epigastric pain    RX for Nulytely sent to the pharmacy.  Pre-op orders placed: BMP. EKG done 3/23/2023  Instructions given to patient.     26-Jan-2024

## 2024-01-26 NOTE — DISCHARGE NOTE OB - CARE PLAN
Principal Discharge DX:	Vaginal delivery  Assessment and plan of treatment:	Make your follow-up appointment with your doctor as ordered.   No heavy lifting, driving, or strenuous activity for 6 weeks.  Nothing per vagina such as tampons, intercourse, douches or tub baths for 6 weeks or until you see your doctor.  Call your doctor if you're unable to tolerate food, increase in vaginal bleeding, or have difficulty urinating. Call your doctor if you have pain that is not relieved by your prescribed medications. Notify your doctor with any other concerns.    Please f/u in 4-6 weeks with Dr. Demian Baker   1

## 2024-01-26 NOTE — OB PROVIDER H&P - NSHPPHYSICALEXAM_GEN_ALL_CORE
Vital Signs Last 24 Hrs  T(C): --  T(F): --  HR: --  BP: --  BP(mean): --  RR: --  SpO2: --        Physical Exam:  Gen: NAD, AxOx3  CV: RRR  Resp: CTAB  Abd: soft, NT, gravid        SVE: 4.5/80/-3  FHT: Category 1  [place]: q 3-4 min  EFW: 3100g  Sono: Cephalic

## 2024-01-26 NOTE — OB PROVIDER DELIVERY SUMMARY - NSSELHIDDEN_OBGYN_ALL_OB_FT
[NS_DeliveryAttending1_OBGYN_ALL_OB_FT:[id number]],[NS_DeliveryAssist1_OBGYN_ALL_OB_FT:[id number]]

## 2024-01-26 NOTE — OB PROVIDER H&P - HISTORY OF PRESENT ILLNESS
HPI: Pt is a 34yo  @38.3wks, STEPHANIE: 24, who presents with c/o of contractions every 3 minutes, rating her pain 9/10, since 10am. This is a default patient who receives her care at [facility] with Dr. Demian Baker.    PNC significant for sickle cell trait. FOB negative. Patient endorses positive fetal movement. Denies LOF/VB. Denies any other complaints at this time.   GBS negative per patient- hard copy obtained   EFW: 3100g            HPI: Pt is a 34yo  @38.3wks, STEPHANIE: 24, who presents with c/o of contractions every 3 minutes, rating her pain 9/10, since 10am. This is a default patient who receives her care at [facility] with Dr. Demian Baker.    PNC significant for sickle cell trait. FOB negative. Patient endorses positive fetal movement. Denies LOF/VB. Denies any other complaints at this time.   GBS negative per patient- hard copy not obtained   EFW: 3100g

## 2024-01-27 LAB
AMPHET UR-MCNC: NEGATIVE — SIGNIFICANT CHANGE UP
BARBITURATES UR SCN-MCNC: NEGATIVE — SIGNIFICANT CHANGE UP
BENZODIAZ UR-MCNC: NEGATIVE — SIGNIFICANT CHANGE UP
COCAINE METAB.OTHER UR-MCNC: NEGATIVE — SIGNIFICANT CHANGE UP
CREATININE URINE RESULT, DAU: 69 MG/DL — SIGNIFICANT CHANGE UP
HCT VFR BLD CALC: 29.9 % — LOW (ref 34.5–45)
HGB BLD-MCNC: 10 G/DL — LOW (ref 11.5–15.5)
METHADONE UR-MCNC: NEGATIVE — SIGNIFICANT CHANGE UP
OPIATES UR-MCNC: NEGATIVE — SIGNIFICANT CHANGE UP
OXYCODONE UR-MCNC: NEGATIVE — SIGNIFICANT CHANGE UP
PCP SPEC-MCNC: SIGNIFICANT CHANGE UP
PCP UR-MCNC: NEGATIVE — SIGNIFICANT CHANGE UP
RUBV IGG SER-ACNC: 4.4 INDEX — SIGNIFICANT CHANGE UP
RUBV IGG SER-IMP: POSITIVE — SIGNIFICANT CHANGE UP
T PALLIDUM AB TITR SER: NEGATIVE — SIGNIFICANT CHANGE UP
THC UR QL: NEGATIVE — SIGNIFICANT CHANGE UP

## 2024-01-27 RX ADMIN — Medication 600 MILLIGRAM(S): at 00:24

## 2024-01-27 RX ADMIN — Medication 975 MILLIGRAM(S): at 21:45

## 2024-01-27 RX ADMIN — Medication 975 MILLIGRAM(S): at 21:15

## 2024-01-27 RX ADMIN — Medication 600 MILLIGRAM(S): at 06:40

## 2024-01-27 RX ADMIN — Medication 600 MILLIGRAM(S): at 19:00

## 2024-01-27 RX ADMIN — Medication 975 MILLIGRAM(S): at 09:00

## 2024-01-27 RX ADMIN — Medication 975 MILLIGRAM(S): at 08:32

## 2024-01-27 RX ADMIN — SODIUM CHLORIDE 3 MILLILITER(S): 9 INJECTION INTRAMUSCULAR; INTRAVENOUS; SUBCUTANEOUS at 00:19

## 2024-01-27 RX ADMIN — Medication 600 MILLIGRAM(S): at 11:59

## 2024-01-27 RX ADMIN — Medication 600 MILLIGRAM(S): at 12:30

## 2024-01-27 RX ADMIN — Medication 975 MILLIGRAM(S): at 03:29

## 2024-01-27 RX ADMIN — Medication 600 MILLIGRAM(S): at 18:21

## 2024-01-27 RX ADMIN — Medication 600 MILLIGRAM(S): at 06:10

## 2024-01-27 RX ADMIN — Medication 975 MILLIGRAM(S): at 03:59

## 2024-01-27 RX ADMIN — SODIUM CHLORIDE 3 MILLILITER(S): 9 INJECTION INTRAMUSCULAR; INTRAVENOUS; SUBCUTANEOUS at 17:54

## 2024-01-27 RX ADMIN — Medication 600 MILLIGRAM(S): at 00:54

## 2024-01-27 NOTE — LACTATION INITIAL EVALUATION - LACTATION INTERVENTIONS
initiate/review safe skin-to-skin/initiate/review hand expression/initiate/review techniques for position and latch/post discharge community resources provided/review techniques to manage sore nipples/engorgement/initiate/review breast massage/compression/reviewed components of an effective feeding and at least 8 effective feedings per day required/reviewed importance of monitoring infant diapers, the breastfeeding log, and minimum output each day/reviewed risks of unnecessary formula supplementation/reviewed risks of artificial nipples/reviewed benefits and recommendations for rooming in/reviewed feeding on demand/by cue at least 8 times a day

## 2024-01-27 NOTE — PROGRESS NOTE ADULT - SUBJECTIVE AND OBJECTIVE BOX
INTERVAL HPI/OVERNIGHT EVENTS:  33y Female s/p labor epidural    Vital Signs Last 24 Hrs  T(C): 36.8 (27 Jan 2024 06:04), Max: 36.8 (27 Jan 2024 06:04)  T(F): 98.2 (27 Jan 2024 06:04), Max: 98.2 (27 Jan 2024 06:04)  HR: 77 (27 Jan 2024 06:04) (77 - 134)  BP: 119/70 (27 Jan 2024 06:04) (86/48 - 146/62)  BP(mean): --  RR: 17 (27 Jan 2024 06:04) (16 - 17)  SpO2: 98% (27 Jan 2024 06:04) (94% - 100%)    Parameters below as of 27 Jan 2024 06:04  Patient On (Oxygen Delivery Method): room air        Patient seen, doing well, no anesthetic complications or complaints noted or reported.

## 2024-01-27 NOTE — PROGRESS NOTE ADULT - SUBJECTIVE AND OBJECTIVE BOX
POST OP DAY  1  s/p   SECTION    SUBJECTIVE: Patient reports feeling fine.      PAIN SCALE SCORE: [x] Refer to charted pain scores    THERAPY:  [x ] Spinal morphine   [  ] Epidural morphine   [  ] IV PCA Hydromorphone 1 mg/ml    OBJECTIVE:     SEDATION SCORE:	  [ x ] Alert	    [  ] Drowsy        [  ] Arousable	[  ] Asleep	[  ] Unresponsive    Side Effects:	  [ x ] None	     [  ] Nausea        [  ] Pruritus        [  ] Weakness   [  ] Numbness        ASSESSMENT/ PLAN   [   ] Discontinue         [  ] Continue    [ x ] Change to prn Analgesics as per primary service.    DOCUMENTATION & VERIFICATION OF CURRENT MEDS [ x ] Done    COMMENTS: No Headache.   POST OP DAY  1  s/p   SECTION    SUBJECTIVE: Patient reports feeling fine.      PAIN SCALE SCORE: [x] Refer to charted pain scores    THERAPY:  [ ] Spinal morphine   [x ] Epidural morphine   [  ] IV PCA Hydromorphone 1 mg/ml    OBJECTIVE:     SEDATION SCORE:	  [ x ] Alert	    [  ] Drowsy        [  ] Arousable	[  ] Asleep	[  ] Unresponsive    Side Effects:	  [ x ] None	     [  ] Nausea        [  ] Pruritus        [  ] Weakness   [  ] Numbness        ASSESSMENT/ PLAN   [   ] Discontinue         [  ] Continue    [ x ] Change to prn Analgesics as per primary service.    DOCUMENTATION & VERIFICATION OF CURRENT MEDS [ x ] Done    COMMENTS: No Headache.

## 2024-01-27 NOTE — PROGRESS NOTE ADULT - ATTENDING COMMENTS
Att:  Pt seen and evaluated by me today. Agree with findings, assessment and plan documented in resident note. Philomena Hawk MD

## 2024-01-28 VITALS
SYSTOLIC BLOOD PRESSURE: 110 MMHG | DIASTOLIC BLOOD PRESSURE: 61 MMHG | RESPIRATION RATE: 17 BRPM | OXYGEN SATURATION: 99 % | TEMPERATURE: 98 F | HEART RATE: 80 BPM

## 2024-01-28 RX ADMIN — Medication 1 TABLET(S): at 12:05

## 2024-01-28 RX ADMIN — Medication 975 MILLIGRAM(S): at 15:21

## 2024-01-28 RX ADMIN — Medication 600 MILLIGRAM(S): at 12:05

## 2024-01-28 RX ADMIN — Medication 975 MILLIGRAM(S): at 08:19

## 2024-01-28 RX ADMIN — Medication 600 MILLIGRAM(S): at 06:24

## 2024-01-28 RX ADMIN — Medication 600 MILLIGRAM(S): at 00:36

## 2024-01-28 RX ADMIN — Medication 600 MILLIGRAM(S): at 05:54

## 2024-01-28 RX ADMIN — Medication 600 MILLIGRAM(S): at 13:05

## 2024-01-28 RX ADMIN — Medication 600 MILLIGRAM(S): at 00:06

## 2024-01-28 RX ADMIN — Medication 975 MILLIGRAM(S): at 09:19

## 2024-01-28 RX ADMIN — Medication 975 MILLIGRAM(S): at 16:21

## 2024-01-28 NOTE — PROGRESS NOTE ADULT - ASSESSMENT
32y/o PPD#1 from  in stable condition. Patient doing well.   -Continue with PO analgesia  -OOB, increase ambulation   -Continue regular diet    -F/u AM CBC     patient seen by ANNA Yancey, PGY-4
34yo  PPD#2 uncomplicated  () stable and progressing well in the postpartum period.    #Postpartum state  - Continue with po analgesia  - Increase ambulation  - Continue regular diet  - IV lock  - No labs  - Dispo planning     Shannon Kumar  PGY-1

## 2024-01-28 NOTE — PROGRESS NOTE ADULT - SUBJECTIVE AND OBJECTIVE BOX
PPD#2 uncomplicated .    Patient seen and examined at bedside, no acute overnight events. No acute complaints, pain well controlled. Patient is ambulating, voiding spontaneously, passing gas, and tolerating regular diet. Denies CP, SOB, N/V, HA, blurred vision, epigastric pain.    Vital Signs Last 24 Hours  T(C): 36.6 (24 @ 06:10), Max: 37 (24 @ 22:09)  HR: 85 (24 @ 06:10) (85 - 97)  BP: 115/68 (24 @ 06:10) (115/65 - 129/69)  RR: 18 (24 @ 06:10) (17 - 18)  SpO2: 99% (24 @ 06:10) (99% - 100%)    Physical Exam:  General: NAD  Abdomen: Soft, non-tender, non-distended, fundus firm  Pelvic: Lochia wnl    Labs:    Blood Type: A Positive  Antibody Screen: Negative  RPR: Negative               10.0   x     )-----------( x        (  @ 07:18 )             29.9                11.0   9.27  )-----------( 236      (  @ 15:50 )             33.7         MEDICATIONS  (STANDING):  acetaminophen     Tablet .. 975 milliGRAM(s) Oral <User Schedule>  diphtheria/tetanus/pertussis (acellular) Vaccine (Adacel) 0.5 milliLiter(s) IntraMuscular once  ibuprofen  Tablet. 600 milliGRAM(s) Oral every 6 hours  oxytocin Infusion 41.667 milliUNIT(s)/Min (125 mL/Hr) IV Continuous <Continuous>  prenatal multivitamin 1 Tablet(s) Oral daily  sodium chloride 0.9% lock flush 3 milliLiter(s) IV Push every 8 hours    MEDICATIONS  (PRN):  benzocaine 20%/menthol 0.5% Spray 1 Spray(s) Topical every 6 hours PRN for Perineal discomfort  dibucaine 1% Ointment 1 Application(s) Topical every 6 hours PRN Perineal discomfort  diphenhydrAMINE 25 milliGRAM(s) Oral every 6 hours PRN Pruritus  hydrocortisone 1% Cream 1 Application(s) Topical every 6 hours PRN Moderate Pain (4-6)  lanolin Ointment 1 Application(s) Topical every 6 hours PRN nipple soreness  magnesium hydroxide Suspension 30 milliLiter(s) Oral two times a day PRN Constipation  oxyCODONE    IR 5 milliGRAM(s) Oral every 3 hours PRN Moderate to Severe Pain (4-10)  oxyCODONE    IR 5 milliGRAM(s) Oral once PRN Moderate to Severe Pain (4-10)  pramoxine 1%/zinc 5% Cream 1 Application(s) Topical every 4 hours PRN Moderate Pain (4-6)  simethicone 80 milliGRAM(s) Chew every 4 hours PRN Gas  witch hazel Pads 1 Application(s) Topical every 4 hours PRN Perineal discomfort

## 2024-01-28 NOTE — PROGRESS NOTE ADULT - ATTENDING COMMENTS
Pt seen and examined.  Pt with no complaints at time of evaluation.  Denies HA, CP, SOB, calf pain, fevers/ chills.  Tolerating regular diet -N/-V.  Pt voiding and ambulating without difficulty.  Pt reports moderate lochia.      ICU Vital Signs Last 24 Hrs  T(C): 36.6 (2024 06:10), Max: 37 (2024 22:09)  T(F): 97.8 (2024 06:10), Max: 98.6 (2024 22:09)  HR: 85 (2024 06:10) (85 - 92)  BP: 115/68 (2024 06:10) (115/65 - 129/69)  BP(mean): --  ABP: --  ABP(mean): --  RR: 18 (2024 06:10) (17 - 18)  SpO2: 99% (2024 06:10) (99% - 100%)    O2 Parameters below as of 2024 06:10  Patient On (Oxygen Delivery Method): room air    General: NAD  Abd: fundus firm nontender  Ext: no calf tenderness b/l       PPD #2 sp . Pt doing well.   Cleared for dc home today    Vee Cain MD

## 2024-06-28 NOTE — OB RN PATIENT PROFILE - BMI (KG/M2)
No nausea. Tired. Still taking terazol cream    AP: 27 yo  female @ 8.3 wks by 8.3 wks (EDC 2024)  who presents for OB visit.    1) di di twin IUP  -s/p official dating scan  -Rh positive    F/u in 4 wks OB visit  Wants genetic testing    MAGAN andino MD    
35

## 2024-07-31 NOTE — OB RN TRIAGE NOTE - FALL HARM RISK CONCLUSION
General Call    Contacts       Contact Date/Time Type Contact Phone/Fax    07/30/2024 06:39 AM CDT Phone (Outgoing) Angi Canales (Self) [phone] (H)          Reason for Call:  PT with sleep study ordered with actigraphy. Please contact pt to schedule.    What are your questions or concerns:  na    Date of last appointment with provider: na    Could we send this information to you in [employer] or would you prefer to receive a phone call?:   Patient would prefer a phone call   Okay to leave a detailed message?: Yes at Cell number on file:    Telephone Information:   Mobile [phone]       
E mail sent to sleep specialists  
[employer] Safety Interventions

## 2024-12-09 NOTE — PROGRESS NOTE ADULT - SUBJECTIVE AND OBJECTIVE BOX
05-Dec-2024 23:17 OB Progress Note:  PPD#1    S: 34yo PPD#1 s/p . Patient feels well. Pain is well controlled. She is tolerating a regular diet and passing flatus. She is voiding spontaneously, and ambulating without difficulty. Endorses light vaginal bleeding, soaking less than 1 pad/hour. Denies CP/SOB. Denies lightheadedness/dizziness. Denies N/V.     O:  Vitals:  Vital Signs Last 24 Hrs  T(C): 36.8 (2024 06:04), Max: 36.8 (2024 06:04)  T(F): 98.2 (2024 06:04), Max: 98.2 (2024 06:04)  HR: 77 (2024 06:04) (77 - 134)  BP: 119/70 (2024 06:04) (86/48 - 146/62)  BP(mean): --  RR: 17 (2024 06:04) (16 - 17)  SpO2: 98% (2024 06:04) (94% - 100%)    Parameters below as of 2024 06:04  Patient On (Oxygen Delivery Method): room air        MEDICATIONS  (STANDING):  acetaminophen     Tablet .. 975 milliGRAM(s) Oral <User Schedule>  diphtheria/tetanus/pertussis (acellular) Vaccine (Adacel) 0.5 milliLiter(s) IntraMuscular once  ibuprofen  Tablet. 600 milliGRAM(s) Oral every 6 hours  oxytocin Infusion 41.667 milliUNIT(s)/Min (125 mL/Hr) IV Continuous <Continuous>  prenatal multivitamin 1 Tablet(s) Oral daily  sodium chloride 0.9% lock flush 3 milliLiter(s) IV Push every 8 hours      Labs:  Blood type: A Positive  Rubella IgG: RPR: Negative                          11.0<L>   9.27 >-----------< 236    (  @ 15:50 )             33.7<L>                  Physical Exam:  General: NAD  Heart: all extremities well perfused  Lungs: breathing comfortably  Abdomen: soft, non-tender, non-distended, fundus firm  Vaginal: lochia wnl  Extremities: No calf tenderness or erythema                 06-Dec-2024 14:38 09-Dec-2024 14:14